# Patient Record
Sex: MALE | Race: BLACK OR AFRICAN AMERICAN | NOT HISPANIC OR LATINO | Employment: FULL TIME | URBAN - METROPOLITAN AREA
[De-identification: names, ages, dates, MRNs, and addresses within clinical notes are randomized per-mention and may not be internally consistent; named-entity substitution may affect disease eponyms.]

---

## 2020-08-28 ENCOUNTER — OFFICE VISIT (OUTPATIENT)
Dept: INTERNAL MEDICINE | Facility: CLINIC | Age: 35
End: 2020-08-28

## 2020-08-28 VITALS
BODY MASS INDEX: 42.66 KG/M2 | OXYGEN SATURATION: 99 % | DIASTOLIC BLOOD PRESSURE: 98 MMHG | TEMPERATURE: 96 F | HEIGHT: 72 IN | WEIGHT: 315 LBS | HEART RATE: 97 BPM | SYSTOLIC BLOOD PRESSURE: 148 MMHG

## 2020-08-28 DIAGNOSIS — R10.13 DYSPEPSIA: ICD-10-CM

## 2020-08-28 DIAGNOSIS — Z79.4 TYPE 2 DIABETES MELLITUS WITHOUT COMPLICATION, WITH LONG-TERM CURRENT USE OF INSULIN (HCC): Primary | ICD-10-CM

## 2020-08-28 DIAGNOSIS — E11.9 TYPE 2 DIABETES MELLITUS WITHOUT COMPLICATION, WITH LONG-TERM CURRENT USE OF INSULIN (HCC): Primary | ICD-10-CM

## 2020-08-28 DIAGNOSIS — L03.012 ACUTE PARONYCHIA OF FINGER OF LEFT HAND: ICD-10-CM

## 2020-08-28 DIAGNOSIS — N52.9 ERECTILE DYSFUNCTION, UNSPECIFIED ERECTILE DYSFUNCTION TYPE: ICD-10-CM

## 2020-08-28 PROCEDURE — 36415 COLL VENOUS BLD VENIPUNCTURE: CPT | Performed by: NURSE PRACTITIONER

## 2020-08-28 PROCEDURE — 99203 OFFICE O/P NEW LOW 30 MIN: CPT | Performed by: NURSE PRACTITIONER

## 2020-08-28 RX ORDER — AMOXICILLIN 500 MG/1
1000 CAPSULE ORAL 2 TIMES DAILY
COMMUNITY
End: 2020-08-28

## 2020-08-28 RX ORDER — OMEPRAZOLE 40 MG/1
40 CAPSULE, DELAYED RELEASE ORAL DAILY
Qty: 30 CAPSULE | Refills: 1 | Status: SHIPPED | OUTPATIENT
Start: 2020-08-28 | End: 2021-01-04 | Stop reason: SDUPTHER

## 2020-08-28 RX ORDER — CEPHALEXIN 500 MG/1
500 TABLET ORAL 3 TIMES DAILY
Qty: 21 TABLET | Refills: 0 | Status: SHIPPED | OUTPATIENT
Start: 2020-08-28 | End: 2020-09-04

## 2020-08-30 PROBLEM — Z79.4 TYPE 2 DIABETES MELLITUS WITHOUT COMPLICATION, WITH LONG-TERM CURRENT USE OF INSULIN (HCC): Status: ACTIVE | Noted: 2020-08-30

## 2020-08-30 PROBLEM — E11.9 TYPE 2 DIABETES MELLITUS WITHOUT COMPLICATION, WITH LONG-TERM CURRENT USE OF INSULIN: Status: ACTIVE | Noted: 2020-08-30

## 2020-08-30 PROBLEM — N52.9 ERECTILE DYSFUNCTION: Status: ACTIVE | Noted: 2020-08-30

## 2020-08-30 LAB
ALBUMIN SERPL-MCNC: 4.6 G/DL (ref 3.5–5.2)
ALBUMIN/CREAT UR: 73 MG/G CREAT (ref 0–29)
ALBUMIN/GLOB SERPL: 2 G/DL
ALP SERPL-CCNC: 72 U/L (ref 39–117)
ALT SERPL-CCNC: 95 U/L (ref 1–41)
AST SERPL-CCNC: 63 U/L (ref 1–40)
BASOPHILS # BLD AUTO: 0.03 10*3/MM3 (ref 0–0.2)
BASOPHILS NFR BLD AUTO: 0.5 % (ref 0–1.5)
BILIRUB SERPL-MCNC: 0.4 MG/DL (ref 0–1.2)
BUN SERPL-MCNC: 12 MG/DL (ref 6–20)
BUN/CREAT SERPL: 13.5 (ref 7–25)
CALCIUM SERPL-MCNC: 9.9 MG/DL (ref 8.6–10.5)
CHLORIDE SERPL-SCNC: 99 MMOL/L (ref 98–107)
CHOLEST SERPL-MCNC: 154 MG/DL (ref 0–200)
CO2 SERPL-SCNC: 27.4 MMOL/L (ref 22–29)
CREAT SERPL-MCNC: 0.89 MG/DL (ref 0.76–1.27)
CREAT UR-MCNC: 194.2 MG/DL
EOSINOPHIL # BLD AUTO: 0.06 10*3/MM3 (ref 0–0.4)
EOSINOPHIL NFR BLD AUTO: 1 % (ref 0.3–6.2)
ERYTHROCYTE [DISTWIDTH] IN BLOOD BY AUTOMATED COUNT: 13.5 % (ref 12.3–15.4)
GLOBULIN SER CALC-MCNC: 2.3 GM/DL
GLUCOSE SERPL-MCNC: 103 MG/DL (ref 65–99)
HBA1C MFR BLD: 10.6 % (ref 4.8–5.6)
HCT VFR BLD AUTO: 43.5 % (ref 37.5–51)
HDLC SERPL-MCNC: 44 MG/DL (ref 40–60)
HGB BLD-MCNC: 14.5 G/DL (ref 13–17.7)
IMM GRANULOCYTES # BLD AUTO: 0.02 10*3/MM3 (ref 0–0.05)
IMM GRANULOCYTES NFR BLD AUTO: 0.3 % (ref 0–0.5)
LDLC SERPL CALC-MCNC: 71 MG/DL (ref 0–100)
LYMPHOCYTES # BLD AUTO: 1.74 10*3/MM3 (ref 0.7–3.1)
LYMPHOCYTES NFR BLD AUTO: 27.8 % (ref 19.6–45.3)
MCH RBC QN AUTO: 29.8 PG (ref 26.6–33)
MCHC RBC AUTO-ENTMCNC: 33.3 G/DL (ref 31.5–35.7)
MCV RBC AUTO: 89.3 FL (ref 79–97)
MICROALBUMIN UR-MCNC: 141.6 UG/ML
MONOCYTES # BLD AUTO: 0.74 10*3/MM3 (ref 0.1–0.9)
MONOCYTES NFR BLD AUTO: 11.8 % (ref 5–12)
NEUTROPHILS # BLD AUTO: 3.68 10*3/MM3 (ref 1.7–7)
NEUTROPHILS NFR BLD AUTO: 58.6 % (ref 42.7–76)
NRBC BLD AUTO-RTO: 0 /100 WBC (ref 0–0.2)
PLATELET # BLD AUTO: 261 10*3/MM3 (ref 140–450)
POTASSIUM SERPL-SCNC: 4.5 MMOL/L (ref 3.5–5.2)
PROT SERPL-MCNC: 6.9 G/DL (ref 6–8.5)
RBC # BLD AUTO: 4.87 10*6/MM3 (ref 4.14–5.8)
SODIUM SERPL-SCNC: 137 MMOL/L (ref 136–145)
TESTOST FREE SERPL-MCNC: 12.6 PG/ML (ref 8.7–25.1)
TESTOST SERPL-MCNC: 220 NG/DL (ref 264–916)
TRIGL SERPL-MCNC: 197 MG/DL (ref 0–150)
VLDLC SERPL CALC-MCNC: 39.4 MG/DL
WBC # BLD AUTO: 6.27 10*3/MM3 (ref 3.4–10.8)

## 2020-08-30 NOTE — PROGRESS NOTES
Subjective   Shanae Lockwood is a 35 y.o. male who present to establish care and to f/u on DM2, GERD and left thumb pain.    He plans to establish care and is currently followed for:  1.  DM2-he was originally diagnosed in 2011 when he was hospitalized with glucose readings over 700.  He states it has confirmed that he has type II.  He currently administers Novolin R and Novolin N, does not check his glucose readings regularly. He has not been seen in >2 years.  2.  Dyspepsia-he complains of increased heartburn and indigestion over the past few weeks.  Denies abdominal pain, no rectal bleeding.  3.  Left thumb pain-he complains of irritation along his left thumb for the past 3 without drainage.  No recent injury.    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 Pertinent negatives for hypoglycemia include no confusion, dizziness, headaches, nervousness/anxiousness, speech difficulty or tremors. Associated symptoms include fatigue. Pertinent negatives for diabetes include no chest pain, no polydipsia, no polyuria and no weakness.   Heartburn   He reports no abdominal pain, no chest pain, no choking, no coughing, no nausea, no sore throat or no wheezing. Associated symptoms include fatigue.   Hand Pain    Pertinent negatives include no chest pain or numbness.        The following portions of the patient's history were reviewed and updated as appropriate: allergies, current medications, past social history and problem list.    No past medical history on file.      Current Outpatient Medications:   •  Insulin NPH Human, Isophane, (NOVOLIN N SC), Inject  under the skin into the appropriate area as directed., Disp: , Rfl:   •  Insulin Regular Human (NOVOLIN R IJ), Inject  as directed 2 (two) times a day., Disp: , Rfl:   •  Cephalexin 500 MG tablet, Take 500 mg by mouth 3 (Three) Times a Day  for 7 days., Disp: 21 tablet, Rfl: 0  •  omeprazole (priLOSEC) 40 MG capsule, Take 1 capsule by mouth Daily., Disp: 30 capsule, Rfl: 1    No Known Allergies    Review of Systems   Constitutional: Positive for fatigue. Negative for activity change, appetite change, chills, diaphoresis, fever and unexpected weight change.   HENT: Negative for congestion, dental problem, drooling, ear discharge, ear pain, facial swelling, hearing loss, mouth sores, nosebleeds, postnasal drip, rhinorrhea, sinus pressure, sore throat, tinnitus and trouble swallowing.    Eyes: Negative for photophobia, pain, discharge, redness, itching and visual disturbance.   Respiratory: Negative for apnea, cough, choking, chest tightness, shortness of breath and wheezing.    Cardiovascular: Negative for chest pain, palpitations and leg swelling.        No orthopnea, PND, JONES   Gastrointestinal: Negative for abdominal pain, blood in stool, constipation, diarrhea, nausea and vomiting.   Endocrine: Negative for cold intolerance, heat intolerance, polydipsia and polyuria.   Genitourinary: Negative for decreased urine volume, dysuria, enuresis, flank pain, frequency, hematuria and urgency.        C/o difficulty maintaining/sustaining an erection   Musculoskeletal: Negative for arthralgias, back pain, gait problem, joint swelling, myalgias, neck pain and neck stiffness.   Skin: Negative for color change and rash.        No hair changes, no nail changes   Allergic/Immunologic: Negative for environmental allergies, food allergies and immunocompromised state.   Neurological: Positive for seizures (+grand mal seizure in under grade (period of extreme stress), managed on meds until 2008 but disocntinued due to lack of seizures). Negative for dizziness, tremors, syncope, speech difficulty, weakness, light-headedness, numbness and headaches.   Hematological: Negative for adenopathy. Does not bruise/bleed easily.   Psychiatric/Behavioral: Negative for agitation,  "confusion, decreased concentration, dysphoric mood, sleep disturbance and suicidal ideas. The patient is not nervous/anxious.        Objective   Vitals:    08/28/20 0924   BP: 148/98   BP Location: Left arm   Patient Position: Sitting   Cuff Size: Adult   Pulse: 97   Temp: 96 °F (35.6 °C)   SpO2: 99%   Weight: (!) 154 kg (340 lb)   Height: 181.6 cm (71.5\")     Body mass index is 46.76 kg/m².  Physical Exam   Constitutional: He is oriented to person, place, and time. He appears well-developed and well-nourished. No distress.   HENT:   Right Ear: Hearing, tympanic membrane, external ear and ear canal normal.   Left Ear: Hearing, tympanic membrane, external ear and ear canal normal.   Nose: Right sinus exhibits no maxillary sinus tenderness and no frontal sinus tenderness. Left sinus exhibits no maxillary sinus tenderness and no frontal sinus tenderness.   Eyes: Pupils are equal, round, and reactive to light. Conjunctivae, EOM and lids are normal.   Neck: Trachea normal and phonation normal. Neck supple. Normal carotid pulses and no JVD present. Carotid bruit is not present. No thyroid mass and no thyromegaly present.   Cardiovascular: Normal rate, regular rhythm, S1 normal and S2 normal. PMI is not displaced. Exam reveals no gallop and no friction rub.   No murmur heard.  Pulses:       Carotid pulses are 2+ on the right side, and 2+ on the left side.       Radial pulses are 2+ on the right side, and 2+ on the left side.        Dorsalis pedis pulses are 2+ on the right side, and 2+ on the left side.   Pulmonary/Chest: Effort normal and breath sounds normal. He has no wheezes. He has no rhonchi. He has no rales. Chest wall is not dull to percussion.   Abdominal: Soft. Normal appearance, normal aorta and bowel sounds are normal. He exhibits no abdominal bruit and no mass. There is no hepatosplenomegaly. There is no tenderness.   Musculoskeletal: Normal range of motion. He exhibits no edema or tenderness. "   Lymphadenopathy:     He has no cervical adenopathy.        Right: No supraclavicular adenopathy present.        Left: No supraclavicular adenopathy present.   Neurological: He is alert and oriented to person, place, and time. He has normal strength and normal reflexes. No cranial nerve deficit or sensory deficit. Coordination normal.   Skin: Skin is warm and dry. No rash noted.   Erythema and exquisite tenderness along lateral nail bed of left thumb   Psychiatric: He has a normal mood and affect. His speech is normal and behavior is normal. Judgment and thought content normal. Cognition and memory are normal. He is attentive.   Nursing note and vitals reviewed.      Assessment/Plan   Shanae was seen today for establish care, diabetes, heartburn and hand pain.    Diagnoses and all orders for this visit:    Type 2 diabetes mellitus without complication, with long-term current use of insulin (CMS/[facility])  -     Hemoglobin A1c  -     Comprehensive Metabolic Panel  -     Lipid Panel  -     Microalbumin / Creatinine Urine Ratio - Urine, Clean Catch  -     CBC & Differential    Acute paronychia of finger of left hand  -     Cephalexin 500 MG tablet; Take 500 mg by mouth 3 (Three) Times a Day for 7 days.    Dyspepsia  -     omeprazole (priLOSEC) 40 MG capsule; Take 1 capsule by mouth Daily.    Erectile dysfunction, unspecified erectile dysfunction type  -     Testosterone, Free, Total    Other orders  -     Testosterone, Free, Total    1. We discussed his current medications for mgmt of DM2, he has experienced loose stools with Metformin in the past. Check A1c today and titrate medication as needed--goal is for A1c <7.  2. Area prepped with alcohol and I&D performed with 22G needle. Purulent matter expressed. Antibiotic ointment and band-aid applied to area. He will soak the thumb and begin Keflex, continue to monitor.  3. He c/o frequent episodes of heartburn and indigestion, will treat with Omeprazole for 6-8 weeks and  continue to monitor.  4. Check Testosterone levels.

## 2020-09-01 DIAGNOSIS — R79.89 LOW TESTOSTERONE: Primary | ICD-10-CM

## 2020-09-02 LAB
FSH SERPL-ACNC: 2.2 MIU/ML (ref 1.5–12.4)
LH SERPL-ACNC: 5.6 MIU/ML (ref 1.7–8.6)
REF LAB TEST METHOD: NORMAL
WRITTEN AUTHORIZATION: NORMAL

## 2020-09-03 ENCOUNTER — TELEPHONE (OUTPATIENT)
Dept: INTERNAL MEDICINE | Facility: CLINIC | Age: 35
End: 2020-09-03

## 2020-09-03 NOTE — TELEPHONE ENCOUNTER
----- Message from Ml Aburto MA sent at 9/2/2020  1:41 PM EDT -----  Regarding: FW: Test Results Question  Contact: [phone]      ----- Message -----  From: Shanae Lockwood  Sent: 9/2/2020  12:35 PM EDT  To: Julissa Campos [facility]  Subject: Test Results Question                            Hello,     Just wanted to follow up with you about my test results. I hope you are having a great week!     Kind regards,  Shanae Rivera message to return call re: lab results.

## 2020-09-04 ENCOUNTER — TELEPHONE (OUTPATIENT)
Dept: INTERNAL MEDICINE | Facility: CLINIC | Age: 35
End: 2020-09-04

## 2020-09-04 DIAGNOSIS — Z79.4 TYPE 2 DIABETES MELLITUS WITHOUT COMPLICATION, WITH LONG-TERM CURRENT USE OF INSULIN (HCC): Primary | ICD-10-CM

## 2020-09-04 DIAGNOSIS — E11.9 TYPE 2 DIABETES MELLITUS WITHOUT COMPLICATION, WITH LONG-TERM CURRENT USE OF INSULIN (HCC): Primary | ICD-10-CM

## 2020-09-04 RX ORDER — METFORMIN HYDROCHLORIDE 500 MG/1
500 TABLET, EXTENDED RELEASE ORAL 2 TIMES DAILY
Qty: 180 TABLET | Refills: 1 | Status: SHIPPED | OUTPATIENT
Start: 2020-09-04 | End: 2021-11-21 | Stop reason: SDUPTHER

## 2020-09-04 NOTE — TELEPHONE ENCOUNTER
PT CALLED RETURNING SHEREE TASHIA PHONE CALL FROM YESTERDAY 09/03 REGARDING LAB RESULTS AND A MEDICATION.    ATTEMPTED TO WARM TRANSFER, SPOKE WITH HEBER IN OFFICE. HEBER STATED MARILIA WAS WITH A PT TO TAKE A MESSAGE     PLEASE ADVISE     PT CALL BACK   [phone] CELL   [phone] WORK

## 2020-09-04 NOTE — TELEPHONE ENCOUNTER
Discussed recent lab results with patient-he is agreeable to starting Metformin, advised to bring glucose reaidngs to next appt. Also discussed changing insulin to Levemir for better glucose control which he is states is not an option at this point due to his high deductible plan on his insurance. We also discussed his low testosterone levels and treatment options, will discuss further at f/u appt.

## 2020-09-14 ENCOUNTER — PATIENT MESSAGE (OUTPATIENT)
Dept: INTERNAL MEDICINE | Facility: CLINIC | Age: 35
End: 2020-09-14

## 2020-09-14 NOTE — TELEPHONE ENCOUNTER
From: Shanae Lockwood  To: ASHWIN Ward  Sent: 9/14/2020 11:56 AM EDT  Subject: Non-Urgent Medical Question    Hello,    I hope you are well today. I am not feeling well today, I woke up with a very bad headache and body aches. I took ibuprofen but the headache persists (1050 mg since 8am). I'm light headed, I feel weak, and have slight nausea. I don't have a fever as of this morning. I am about to leave work because I feel really tired. I'm reaching out to see if this can be serious. I can taste and smell things. I don't believe I came in contact with anyone that has COVID. I always wear a mask, wash my hands, and use hand  when I'm out and about. I started feeling really tired last night and went to bed around 6pm getting roughly 12 hrs of sleep (slightly broken up).     My blood sugar is 100 as of 10am. I've not noticed any negative side effects from the metformin. I do feel that maybe getting some testosterone shots can make me feel less fatigued all the time and may assist with having motivation and energy to work out. I wanted to see if we could maybe address this sooner rather than waiting for my appointment.     Thanks. I look forward to hearing from you.     Shanae

## 2020-09-25 ENCOUNTER — OFFICE VISIT (OUTPATIENT)
Dept: INTERNAL MEDICINE | Facility: CLINIC | Age: 35
End: 2020-09-25

## 2020-09-25 VITALS
HEIGHT: 72 IN | OXYGEN SATURATION: 98 % | BODY MASS INDEX: 42.66 KG/M2 | HEART RATE: 105 BPM | DIASTOLIC BLOOD PRESSURE: 86 MMHG | SYSTOLIC BLOOD PRESSURE: 144 MMHG | TEMPERATURE: 98.2 F | WEIGHT: 315 LBS

## 2020-09-25 DIAGNOSIS — E11.65 UNCONTROLLED TYPE 2 DIABETES MELLITUS WITH HYPERGLYCEMIA (HCC): Primary | ICD-10-CM

## 2020-09-25 DIAGNOSIS — Z23 NEED FOR INFLUENZA VACCINATION: ICD-10-CM

## 2020-09-25 DIAGNOSIS — R79.89 LOW TESTOSTERONE: ICD-10-CM

## 2020-09-25 DIAGNOSIS — E11.21 DIABETIC NEPHROPATHY WITH PROTEINURIA (HCC): ICD-10-CM

## 2020-09-25 PROCEDURE — 90471 IMMUNIZATION ADMIN: CPT | Performed by: NURSE PRACTITIONER

## 2020-09-25 PROCEDURE — 99214 OFFICE O/P EST MOD 30 MIN: CPT | Performed by: NURSE PRACTITIONER

## 2020-09-25 PROCEDURE — 90686 IIV4 VACC NO PRSV 0.5 ML IM: CPT | Performed by: NURSE PRACTITIONER

## 2020-09-25 RX ORDER — TESTOSTERONE CYPIONATE 100 MG/ML
100 INJECTION, SOLUTION INTRAMUSCULAR
Qty: 0.84 ML | Refills: 0 | Status: SHIPPED | OUTPATIENT
Start: 2020-09-25 | End: 2020-09-28 | Stop reason: SDUPTHER

## 2020-09-25 NOTE — PROGRESS NOTES
Missy Lockwood is a 35 y.o. male who presents for f/u regarding DM2 and low testosterone levels.    He has started Metformin and is tolerating medication well, denies GI side effects. He has brought his glucose readings which show an am reading of 110-130s, has an isolated reading of 351. Denies hypoglycemic episodes.  His testosterone levels were also low, he c/o increased lethargy throughout the day with decreased sexual performance. He has recently had a sperm analysis through a fertility doctor with normal sperm motility and count.  His nail irritation has resolved since his last visit. He also states malaise and respiratory sx have resolved. He does c/o fatigue throughout the day.    Diabetes  He has type 2 diabetes mellitus. No MedicAlert identification noted. The initial diagnosis of diabetes was made 11 years ago. Hypoglycemia symptoms include mood changes, nervousness/anxiousness and sweats. Pertinent negatives for hypoglycemia include no confusion, dizziness, headaches, hunger, pallor, seizures, sleepiness or speech difficulty. Associated symptoms include blurred vision and fatigue. Pertinent negatives for diabetes include no chest pain, no foot paresthesias, no foot ulcerations, no polydipsia, no polyphagia, no polyuria, no visual change, no weakness and no weight loss. Pertinent negatives for hypoglycemia complications include no blackouts, no hospitalization, no nocturnal hypoglycemia, no required assistance and no required glucagon injection. Symptoms are stable. Diabetic complications include impotence, PVD and retinopathy. Pertinent negatives for diabetic complications include no CVA, heart disease, nephropathy or peripheral neuropathy. Risk factors for coronary artery disease include obesity and sedentary lifestyle. Current diabetic treatment includes insulin injections and oral agent (monotherapy). He is compliant with treatment most of the time. He is currently taking insulin pre-lunch,  "pre-dinner and at bedtime. Insulin injections are given by patient. Rotation sites for injection include the abdominal wall. His weight is fluctuating minimally. He is following a generally unhealthy, high fat/cholesterol and vegetarian diet. Meal planning includes avoidance of concentrated sweets. He has not had a previous visit with a dietitian. He participates in exercise intermittently. He monitors blood glucose at home 3-4 x per week. He monitors urine at home <1 x per month. Blood glucose monitoring compliance is inadequate. His home blood glucose trend is fluctuating dramatically. His breakfast blood glucose is taken between 7-8 am. His breakfast blood glucose range is generally 110-130 mg/dl. His lunch blood glucose is taken between 1-2 pm. His lunch blood glucose range is generally 140-180 mg/dl. His dinner blood glucose is taken between 7-8 pm. His dinner blood glucose range is generally 110-130 mg/dl. His highest blood glucose is >200 mg/dl. His overall blood glucose range is 110-130 mg/dl. He does not see a podiatrist.Eye exam is not current.        The following portions of the patient's history were reviewed and updated as appropriate: allergies, current medications, past social history and problem list.    History reviewed. No pertinent past medical history.      Current Outpatient Medications:   •  Insulin NPH Human, Isophane, (NOVOLIN N SC), Inject 100 Units under the skin into the appropriate area as directed Daily With Breakfast, Lunch & Dinner., Disp: , Rfl:   •  Insulin Regular Human (NOVOLIN R IJ), Inject 100 Units as directed 2 (two) times a day., Disp: , Rfl:   •  metFORMIN ER (GLUCOPHAGE-XR) 500 MG 24 hr tablet, Take 1 tablet by mouth 2 (two) times a day., Disp: 180 tablet, Rfl: 1  •  omeprazole (priLOSEC) 40 MG capsule, Take 1 capsule by mouth Daily., Disp: 30 capsule, Rfl: 1  •  Needle, Disp, 21G X 1\" misc, 1 Units Every 14 (Fourteen) Days., Disp: 50 each, Rfl: 1  •  testosterone cypionate " "(Depo-Testosterone) 100 MG/ML solution injection, Inject 1 mL into the appropriate muscle as directed by prescriber Every 14 (Fourteen) Days., Disp: 0.84 mL, Rfl: 0    No Known Allergies    Review of Systems   Constitutional: Positive for fatigue. Negative for chills, fever, unexpected weight change and weight loss.   HENT: Positive for congestion and postnasal drip. Negative for ear pain, sinus pressure, sore throat and trouble swallowing.    Eyes: Positive for blurred vision. Negative for visual disturbance.   Respiratory: Negative for cough, chest tightness and wheezing.    Cardiovascular: Negative for chest pain, palpitations and leg swelling.   Gastrointestinal: Negative for abdominal pain, blood in stool, nausea and vomiting.   Endocrine: Negative for polydipsia, polyphagia and polyuria.   Genitourinary: Positive for impotence. Negative for dysuria, frequency and urgency.   Musculoskeletal: Negative for arthralgias and joint swelling.   Skin: Negative for color change and pallor.   Neurological: Negative for dizziness, seizures, syncope, speech difficulty, weakness and headaches.   Hematological: Does not bruise/bleed easily.   Psychiatric/Behavioral: Negative for confusion and sleep disturbance. The patient is nervous/anxious.        Objective   Vitals:    09/25/20 0917   BP: 144/86   BP Location: Left arm   Patient Position: Sitting   Cuff Size: Adult   Pulse: 105   Temp: 98.2 °F (36.8 °C)   TempSrc: Oral   SpO2: 98%   Weight: (!) 160 kg (353 lb)   Height: 181.6 cm (71.5\")     Body mass index is 48.55 kg/m².  Physical Exam  Constitutional:       Appearance: He is well-developed. He is not ill-appearing.   HENT:      Head: Normocephalic.      Right Ear: Hearing, tympanic membrane and external ear normal. No decreased hearing noted. No drainage, swelling or tenderness. No middle ear effusion. No mastoid tenderness. Tympanic membrane is not injected, scarred, erythematous or bulging. Tympanic membrane has " normal mobility.      Left Ear: Hearing, tympanic membrane and external ear normal.      Nose: Nose normal. No nasal deformity, mucosal edema or rhinorrhea.      Right Sinus: No maxillary sinus tenderness or frontal sinus tenderness.      Left Sinus: No maxillary sinus tenderness or frontal sinus tenderness.      Mouth/Throat:      Dentition: Normal dentition.   Eyes:      General: Lids are normal.         Right eye: No discharge.         Left eye: No discharge.      Conjunctiva/sclera: Conjunctivae normal.      Right eye: No exudate.     Left eye: No exudate.  Neck:      Musculoskeletal: Normal range of motion. No edema.      Thyroid: No thyroid mass or thyromegaly.      Vascular: No carotid bruit.      Trachea: Trachea normal.   Cardiovascular:      Rate and Rhythm: Regular rhythm.      Pulses: Normal pulses.      Heart sounds: Normal heart sounds. No murmur.   Pulmonary:      Effort: No respiratory distress.      Breath sounds: Normal breath sounds. No decreased breath sounds, wheezing, rhonchi or rales.   Abdominal:      General: Bowel sounds are normal.      Palpations: Abdomen is soft.      Tenderness: There is no abdominal tenderness.   Lymphadenopathy:      Head:      Right side of head: No submental, submandibular, tonsillar, preauricular, posterior auricular or occipital adenopathy.      Left side of head: No submental, submandibular, tonsillar, preauricular, posterior auricular or occipital adenopathy.   Skin:     General: Skin is warm and dry.      Nails: There is no clubbing.     Neurological:      Mental Status: He is alert.   Psychiatric:         Behavior: Behavior is cooperative.         Assessment/Plan   Shanae was seen today for diabetes and low testosterone.    Diagnoses and all orders for this visit:    Uncontrolled type 2 diabetes mellitus with hyperglycemia (CMS/[facility])    Low testosterone  -     testosterone cypionate (Depo-Testosterone) 100 MG/ML solution injection; Inject 1 mL into the  "appropriate muscle as directed by prescriber Every 14 (Fourteen) Days.  -     Needle, Disp, 21G X 1\" misc; 1 Units Every 14 (Fourteen) Days.    Need for influenza vaccination  -     FluLaval Quad >6 Months ([id number])    1. Uncontrolled DM2-he will continue Metformin which he is tolerating well. Discussed switching to long-acting insulin for improved glucose control, recent A1c 10.9. He will check into patient assistance programs, currently has a large deductible insurance plan and long-acting insulin is not an option.  2. Low testosterone-discussed treatment options as well as side effects of medication. He would like to begin injections-he will receive the first at the clinic with education to administer at home.  3. He will begin Lisinopril for renal protection, monitor for low BP readings.    He will fu in 3 months for repeat labs and evaluation of DM2.       "

## 2020-09-26 PROBLEM — E11.65 UNCONTROLLED TYPE 2 DIABETES MELLITUS WITH HYPERGLYCEMIA: Status: ACTIVE | Noted: 2020-09-26

## 2020-09-26 PROBLEM — R79.89 LOW TESTOSTERONE: Status: ACTIVE | Noted: 2020-09-26

## 2020-09-26 RX ORDER — LISINOPRIL 5 MG/1
5 TABLET ORAL DAILY
Qty: 90 TABLET | Refills: 1 | Status: SHIPPED | OUTPATIENT
Start: 2020-09-26 | End: 2021-12-14

## 2020-09-28 DIAGNOSIS — R79.89 LOW TESTOSTERONE: ICD-10-CM

## 2020-09-28 RX ORDER — TESTOSTERONE CYPIONATE 100 MG/ML
100 INJECTION, SOLUTION INTRAMUSCULAR
Qty: 10 ML | Refills: 0 | Status: SHIPPED | OUTPATIENT
Start: 2020-09-28 | End: 2021-01-04 | Stop reason: SDUPTHER

## 2020-09-28 RX ORDER — TESTOSTERONE CYPIONATE 100 MG/ML
100 INJECTION, SOLUTION INTRAMUSCULAR
Qty: 0.84 ML | Refills: 0 | Status: CANCELLED | OUTPATIENT
Start: 2020-09-28

## 2020-12-01 ENCOUNTER — E-VISIT (OUTPATIENT)
Dept: FAMILY MEDICINE CLINIC | Facility: TELEHEALTH | Age: 35
End: 2020-12-01

## 2020-12-01 DIAGNOSIS — J02.9 ACUTE PHARYNGITIS, UNSPECIFIED ETIOLOGY: Primary | ICD-10-CM

## 2020-12-01 DIAGNOSIS — K12.2 UVULITIS: ICD-10-CM

## 2020-12-01 PROCEDURE — 99422 OL DIG E/M SVC 11-20 MIN: CPT | Performed by: NURSE PRACTITIONER

## 2020-12-01 NOTE — PATIENT INSTRUCTIONS
Uvulitis    Uvulitis is inflammation of the uvula. The uvula is the small, finger-like piece of tissue that hangs down at the back of the throat. Uvulitis causes swelling and soreness of the uvula. It can also cause other symptoms, depending on the cause and severity of the condition.  What are the causes?  This condition may be caused by:  · An infection in the mouth or throat. This is the most common cause.  · Trauma or injury to the uvula. Causes of trauma include burning your mouth, damage from a medical procedure, and heavy snoring.  · Fluid buildup (edema). Edema can be triggered by an allergic reaction. Uvulitis that is caused by edema is called Quincke disease.  · Inhaling chemicals, smoke, steam, or other substances that irritate the body.  What are the signs or symptoms?  Symptoms of this condition depend on the cause.  Symptoms of uvulitis that is caused by infection include:  · Red, swollen uvula.  · Sore throat.  · Fever.  · Headache.  · Swollen neck glands.  Symptoms of uvulitis that is caused by trauma, edema, or irritation include:  · Red, swollen uvula.  · Sore throat.  · Trouble swallowing.  · Choking or gagging.  · Trouble breathing.  How is this diagnosed?  This condition is diagnosed with a physical exam. You may also have tests, such as a throat culture or blood tests, to help find the cause of the condition.  How is this treated?  Treatment for this condition depends on the cause. Treatment may involve:  · Antibiotic medicine for a bacterial infection.  · Steroid medicine if the condition is caused by edema.  · Surgery to remove part of the uvula (partial uvulectomy). Surgery is only needed in rare cases where the swelling does not go away after trying other treatments.  Follow these instructions at home:    Medicines  · Take over-the-counter and prescription medicines only as told by your health care provider.  · If you were prescribed an antibiotic medicine, take it as told by your health  care provider. Do not stop taking the antibiotic even if you start to feel better.  Managing pain and soreness    · Use a cool-mist humidifier to add moisture to the air. This can help ease irritation in your throat.  · While your throat is sore:  ? Eat a diet of soft foods or liquids.  ? Gargle with a salt-water mixture 3-4 times a day or as needed. To make a salt-water mixture, completely dissolve ½-1 tsp of salt in 1 cup of warm water.  General instructions  · Drink enough fluid to keep your urine pale yellow.  · Keep all follow-up visits as told by your health care provider. This is important.  Contact a health care provider if:  · You have a fever.  · You have trouble eating.  · Your symptoms do not get better.  · Your symptoms come back after treatment.  Get help right away if:  · You have trouble breathing.  · You have trouble swallowing.  Summary  · Uvulitis is inflammation of the uvula, which is the small, finger-like piece of tissue that hangs down at the back of the throat.  · Uvulitis causes swelling and soreness of the uvula.  · This condition may be treated with antibiotics or steroids. In rare cases, surgery may be needed.  This information is not intended to replace advice given to you by your health care provider. Make sure you discuss any questions you have with your health care provider.  Document Revised: 04/04/2019 Document Reviewed: 04/04/2019  Omn[employer] Patient Education © 2020 [employer]  Sore Throat  A sore throat is pain, burning, irritation, or scratchiness in the throat. When you have a sore throat, you may feel pain or tenderness in your throat when you swallow or talk.  Many things can cause a sore throat, including:  · An infection.  · Seasonal allergies.  · Dryness in the air.  · Irritants, such as smoke or pollution.  · Radiation treatment to the area.  · Gastroesophageal reflux disease (GERD).  · A tumor.  A sore throat is often the first sign of another sickness. It may happen  with other symptoms, such as coughing, sneezing, fever, and swollen neck glands. Most sore throats go away without medical treatment.  Follow these instructions at home:         · Take over-the-counter medicines only as told by your health care provider.  ? If your child has a sore throat, do not give your child aspirin because of the association with Reye syndrome.  · Drink enough fluids to keep your urine pale yellow.  · Rest as needed.  · To help with pain, try:  ? Sipping warm liquids, such as broth, herbal tea, or warm water.  ? Eating or drinking cold or frozen liquids, such as frozen ice pops.  ? Gargling with a salt-water mixture 3-4 times a day or as needed. To make a salt-water mixture, completely dissolve ½-1 tsp (3-6 g) of salt in 1 cup (237 mL) of warm water.  ? Sucking on hard candy or throat lozenges.  ? Putting a cool-mist humidifier in your bedroom at night to moisten the air.  ? Sitting in the bathroom with the door closed for 5-10 minutes while you run hot water in the shower.  · Do not use any products that contain nicotine or tobacco, such as cigarettes, e-cigarettes, and chewing tobacco. If you need help quitting, ask your health care provider.  · Wash your hands well and often with soap and water. If soap and water are not available, use hand .  Contact a health care provider if:  · You have a fever for more than 2-3 days.  · You have symptoms that last (are persistent) for more than 2-3 days.  · Your throat does not get better within 7 days.  · You have a fever and your symptoms suddenly get worse.  · Your child who is 3 months to 3 years old has a temperature of 102.2°F (39°C) or higher.  Get help right away if:  · You have difficulty breathing.  · You cannot swallow fluids, soft foods, or your saliva.  · You have increased swelling in your throat or neck.  · You have persistent nausea and vomiting.  Summary  · A sore throat is pain, burning, irritation, or scratchiness in the  throat. Many things can cause a sore throat.  · Take over-the-counter medicines only as told by your health care provider. Do not give your child aspirin.  · Drink plenty of fluids, and rest as needed.  · Contact a health care provider if your symptoms worsen or your sore throat does not get better within 7 days.  This information is not intended to replace advice given to you by your health care provider. Make sure you discuss any questions you have with your health care provider.  Document Revised: 05/20/2019 Document Reviewed: 05/20/2019  Elsevier Patient Education © 2020 [employer]

## 2020-12-01 NOTE — PROGRESS NOTES
I have reviewed the e-Visit questionnaire and patient's answers, and my assessment and plan are as follows:    HPI  Shanae Lockwood is a 35 y.o. male  presents with complaint of uvula swelling, occasional dry cough, and sore throat starting today. Smoker in social situations. Has a history of diabetes. He reports he was able to see red swollen tonsils and uvula with a flashlight. Temp is 97.6 and no fever. No known strep exposure. He reports feeling okay besides the sore throat and uvula swelling.     Review of Systems - Negative except those listed in the HPI.      Diagnoses and all orders for this visit:    1. Acute pharyngitis, unspecified etiology (Primary)    2. Uvulitis    *Take ibuprofen as needed which may decrease some of the swelling.   **Patient refused steroids or antibiotics at this time.         FOLLOW-UP  If symptoms worsen or fail to improve, follow-up with PCP/Urgent Care/Emergency Department.      Time Documentation  Counseled patient  Counseling topics: diagnosis, treatment options and follow up plan  Total encounter time: counseling time more than 50% of visit: 15 minutes        Ashley Car, ASHWIN  12/01/20  12:39 EST

## 2020-12-07 DIAGNOSIS — R79.89 LOW TESTOSTERONE: ICD-10-CM

## 2020-12-28 ENCOUNTER — OFFICE VISIT (OUTPATIENT)
Dept: INTERNAL MEDICINE | Facility: CLINIC | Age: 35
End: 2020-12-28

## 2020-12-28 VITALS
WEIGHT: 315 LBS | SYSTOLIC BLOOD PRESSURE: 144 MMHG | OXYGEN SATURATION: 96 % | HEART RATE: 106 BPM | HEIGHT: 72 IN | DIASTOLIC BLOOD PRESSURE: 90 MMHG | RESPIRATION RATE: 16 BRPM | BODY MASS INDEX: 42.66 KG/M2

## 2020-12-28 DIAGNOSIS — R79.89 LOW TESTOSTERONE: ICD-10-CM

## 2020-12-28 DIAGNOSIS — E11.65 UNCONTROLLED TYPE 2 DIABETES MELLITUS WITH HYPERGLYCEMIA (HCC): Primary | ICD-10-CM

## 2020-12-28 DIAGNOSIS — Z11.59 SCREENING FOR VIRAL DISEASE: ICD-10-CM

## 2020-12-28 DIAGNOSIS — K21.9 GASTROESOPHAGEAL REFLUX DISEASE, UNSPECIFIED WHETHER ESOPHAGITIS PRESENT: ICD-10-CM

## 2020-12-28 PROCEDURE — 99214 OFFICE O/P EST MOD 30 MIN: CPT | Performed by: NURSE PRACTITIONER

## 2020-12-29 PROBLEM — K21.9 GASTROESOPHAGEAL REFLUX DISEASE: Status: ACTIVE | Noted: 2020-12-29

## 2020-12-29 RX ORDER — TESTOSTERONE CYPIONATE 100 MG/ML
200 INJECTION, SOLUTION INTRAMUSCULAR
Qty: 2 ML | Refills: 1 | Status: CANCELLED | OUTPATIENT
Start: 2020-12-29

## 2020-12-29 NOTE — PROGRESS NOTES
Missy Lockwood is a 35 y.o. male who presents for f/u regarding DM2, low testosterone and a penile rash.    His last A1c was elevated at 10, he states glucose improved with Metformin (tolerating well) but also intermittently forgets his mealtime insulin.   He increased his testosterone injections to 200mg q3fdmpx on his own (previously on 100mg) which he states has been helpful.  He c/o a pruritic rash on the tip of the penis last week which improved with an antifungal cream.    Diabetes  He has type 2 diabetes mellitus. No MedicAlert identification noted. The initial diagnosis of diabetes was made 9 years ago. Hypoglycemia symptoms include mood changes, nervousness/anxiousness, sweats and tremors. Pertinent negatives for hypoglycemia include no confusion, dizziness, headaches, hunger, pallor, seizures, sleepiness or speech difficulty. Associated symptoms include blurred vision, fatigue, visual change and weakness. Pertinent negatives for diabetes include no chest pain, no foot paresthesias, no foot ulcerations, no polydipsia, no polyphagia, no polyuria and no weight loss. Hypoglycemia complications include nocturnal hypoglycemia. Pertinent negatives for hypoglycemia complications include no blackouts, no hospitalization, no required assistance and no required glucagon injection. Symptoms are improving. Diabetic complications include impotence and retinopathy. Pertinent negatives for diabetic complications include no CVA, heart disease, nephropathy, peripheral neuropathy or PVD. Risk factors for coronary artery disease include obesity and sedentary lifestyle. Current diabetic treatment includes insulin injections and oral agent (monotherapy). He is compliant with treatment most of the time. He is currently taking insulin pre-dinner. Insulin injections are given by patient. Rotation sites for injection include the abdominal wall. His weight is increasing steadily. He is following a vegetarian diet. When asked  "about meal planning, he reported none. He has not had a previous visit with a dietitian. He rarely participates in exercise. He monitors blood glucose at home 1-2 x per week. He monitors urine at home <1 x per month. Blood glucose monitoring compliance is poor. His home blood glucose trend is increasing rapidly. He does not see a podiatrist.Eye exam is current.   Rash  Associated symptoms include fatigue. Pertinent negatives include no congestion, cough, fever, sore throat or vomiting.        The following portions of the patient's history were reviewed and updated as appropriate: allergies, current medications, past social history and problem list.    History reviewed. No pertinent past medical history.      Current Outpatient Medications:   •  Insulin NPH Human, Isophane, (NOVOLIN N SC), Inject 100 Units under the skin into the appropriate area as directed Daily With Breakfast, Lunch & Dinner., Disp: , Rfl:   •  Insulin Regular Human (NOVOLIN R IJ), Inject 100 Units as directed 2 (two) times a day., Disp: , Rfl:   •  lisinopril (PRINIVIL,ZESTRIL) 5 MG tablet, Take 1 tablet by mouth Daily., Disp: 90 tablet, Rfl: 1  •  metFORMIN ER (GLUCOPHAGE-XR) 500 MG 24 hr tablet, Take 1 tablet by mouth 2 (two) times a day., Disp: 180 tablet, Rfl: 1  •  Needle, Disp, 21G X 1\" misc, 1 Units Every 14 (Fourteen) Days., Disp: 50 each, Rfl: 1  •  omeprazole (priLOSEC) 40 MG capsule, Take 1 capsule by mouth Daily., Disp: 30 capsule, Rfl: 1  •  testosterone cypionate (Depo-Testosterone) 100 MG/ML solution injection, Inject 1 mL into the appropriate muscle as directed by prescriber Every 14 (Fourteen) Days. (Patient taking differently: Inject 200 mg into the appropriate muscle as directed by prescriber Every 14 (Fourteen) Days.), Disp: 10 mL, Rfl: 0    No Known Allergies    Review of Systems   Constitutional: Positive for fatigue. Negative for chills, fever, unexpected weight change and weight loss.   HENT: Negative for congestion, ear " "pain, postnasal drip, sinus pressure, sore throat and trouble swallowing.    Eyes: Positive for blurred vision. Negative for visual disturbance.   Respiratory: Negative for cough, chest tightness and wheezing.    Cardiovascular: Negative for chest pain, palpitations and leg swelling.   Gastrointestinal: Negative for abdominal pain, blood in stool, nausea and vomiting.   Endocrine: Negative for polydipsia, polyphagia and polyuria.   Genitourinary: Positive for impotence. Negative for dysuria, frequency and urgency.   Musculoskeletal: Negative for arthralgias and joint swelling.   Skin: Positive for rash. Negative for color change and pallor.   Neurological: Positive for tremors and weakness. Negative for dizziness, seizures, syncope, speech difficulty and headaches.   Hematological: Does not bruise/bleed easily.   Psychiatric/Behavioral: Negative for confusion. The patient is nervous/anxious.        Objective   Vitals:    12/28/20 1328   BP: 144/90   BP Location: Left arm   Patient Position: Sitting   Cuff Size: Adult   Pulse: 106   Resp: 16   SpO2: 96%   Weight: (!) 166 kg (366 lb)   Height: 181.6 cm (71.5\")     Body mass index is 50.34 kg/m².  Physical Exam  Constitutional:       Appearance: He is well-developed. He is not ill-appearing.   HENT:      Head: Normocephalic.      Right Ear: Hearing, tympanic membrane and external ear normal.      Left Ear: Hearing, tympanic membrane and external ear normal.      Nose: Nose normal. No nasal deformity, mucosal edema or rhinorrhea.      Right Sinus: No maxillary sinus tenderness or frontal sinus tenderness.      Left Sinus: No maxillary sinus tenderness or frontal sinus tenderness.      Mouth/Throat:      Dentition: Normal dentition.   Eyes:      General: Lids are normal.         Right eye: No discharge.         Left eye: No discharge.      Conjunctiva/sclera: Conjunctivae normal.      Right eye: No exudate.     Left eye: No exudate.     Pupils: Pupils are equal, round, " and reactive to light.   Neck:      Musculoskeletal: Normal range of motion. No edema.      Thyroid: No thyroid mass or thyromegaly.      Vascular: No carotid bruit.      Trachea: Trachea normal.   Cardiovascular:      Rate and Rhythm: Regular rhythm.      Pulses: Normal pulses.           Dorsalis pedis pulses are 2+ on the right side and 2+ on the left side.        Posterior tibial pulses are 2+ on the right side and 2+ on the left side.      Heart sounds: Normal heart sounds. No murmur.   Pulmonary:      Effort: No respiratory distress.      Breath sounds: Normal breath sounds. No decreased breath sounds, wheezing, rhonchi or rales.   Genitourinary:     Penis: No erythema, swelling or lesions.    Musculoskeletal:      Right foot: Normal range of motion.      Left foot: Normal range of motion.   Feet:      Right foot:      Protective Sensation: 10 sites tested. 10 sites sensed.      Skin integrity: Skin integrity normal. No ulcer, blister or skin breakdown.      Toenail Condition: Right toenails are normal.      Left foot:      Protective Sensation: 10 sites tested. 10 sites sensed.      Skin integrity: Skin integrity normal. No ulcer, blister or skin breakdown.      Toenail Condition: Left toenails are normal.      Comments: Diabetic Foot Exam Performed    Lymphadenopathy:      Head:      Right side of head: No submental, submandibular, tonsillar, preauricular, posterior auricular or occipital adenopathy.      Left side of head: No submental, submandibular, tonsillar, preauricular, posterior auricular or occipital adenopathy.   Skin:     General: Skin is warm and dry.      Nails: There is no clubbing.     Neurological:      Mental Status: He is alert.   Psychiatric:         Behavior: Behavior is cooperative.         Assessment/Plan   Diagnoses and all orders for this visit:    1. Uncontrolled type 2 diabetes mellitus with hyperglycemia (CMS/[facility]) (Primary)  -     Hemoglobin A1c; Future  -     Comprehensive  Metabolic Panel; Future    2. Low testosterone  -     Testosterone, Free, Total; Future    3. Screening for viral disease  -     Hepatitis C Antibody; Future    1. Uncontrolled DM2-he will return for repeat A1c since starting Metformin, discussed importance of regular dosing of insulin. His insurance does not cover long-acting insulin but hopeful to change in 2021. Also discussed endo evaluation if glucose remains uncontrolled.  2. Low testosterone-he has increased his Testosterone inj to 200mg q14 days, he is advised to come in for fasting labs this week (last injection last Wednesday) for repeat level.  3. GERD-improved with daily Omeprazole, continue to monitor.    No acute abnl noted on penis today, recent rash most likely due to fungal infection (glucose elevated). He will continue to monitor and f/u if sx return.

## 2021-01-04 DIAGNOSIS — R79.89 LOW TESTOSTERONE: ICD-10-CM

## 2021-01-04 DIAGNOSIS — R10.13 DYSPEPSIA: ICD-10-CM

## 2021-01-04 RX ORDER — OMEPRAZOLE 40 MG/1
40 CAPSULE, DELAYED RELEASE ORAL DAILY
Qty: 90 CAPSULE | Refills: 2 | Status: SHIPPED | OUTPATIENT
Start: 2021-01-04 | End: 2022-04-11 | Stop reason: SDUPTHER

## 2021-01-07 ENCOUNTER — TELEPHONE (OUTPATIENT)
Dept: INTERNAL MEDICINE | Facility: CLINIC | Age: 36
End: 2021-01-07

## 2021-01-07 RX ORDER — TESTOSTERONE CYPIONATE 100 MG/ML
200 INJECTION, SOLUTION INTRAMUSCULAR
Qty: 4 ML | Refills: 0 | Status: SHIPPED | OUTPATIENT
Start: 2021-01-07 | End: 2021-01-08 | Stop reason: SDUPTHER

## 2021-01-07 NOTE — TELEPHONE ENCOUNTER
PHARMACY CALLED AND STATES THE TESTOSTERONE PRESCRIPTION COMES IN 10 ML ONLY. THEY NEED A NEW PRESCRIPTION.   testosterone cypionate (Depo-Testosterone) 100 MG/ML solution injection    [facility] [address] [place] [address] ([facility]), [place] - [address] [facility] - [phone] [facility] [phone]   [phone]

## 2021-01-08 DIAGNOSIS — R79.89 LOW TESTOSTERONE: Primary | ICD-10-CM

## 2021-01-08 RX ORDER — TESTOSTERONE CYPIONATE 100 MG/ML
200 INJECTION, SOLUTION INTRAMUSCULAR
Qty: 10 ML | Refills: 0 | Status: SHIPPED | OUTPATIENT
Start: 2021-01-08 | End: 2021-03-07 | Stop reason: SDUPTHER

## 2021-02-10 ENCOUNTER — OFFICE VISIT (OUTPATIENT)
Dept: INTERNAL MEDICINE | Facility: CLINIC | Age: 36
End: 2021-02-10

## 2021-02-10 ENCOUNTER — APPOINTMENT (OUTPATIENT)
Dept: WOMENS IMAGING | Facility: HOSPITAL | Age: 36
End: 2021-02-10

## 2021-02-10 ENCOUNTER — TELEPHONE (OUTPATIENT)
Dept: INTERNAL MEDICINE | Facility: CLINIC | Age: 36
End: 2021-02-10

## 2021-02-10 VITALS
SYSTOLIC BLOOD PRESSURE: 148 MMHG | HEIGHT: 72 IN | DIASTOLIC BLOOD PRESSURE: 92 MMHG | BODY MASS INDEX: 42.66 KG/M2 | TEMPERATURE: 98 F | HEART RATE: 118 BPM | WEIGHT: 315 LBS | OXYGEN SATURATION: 99 %

## 2021-02-10 DIAGNOSIS — R60.0 EDEMA OF BOTH LOWER EXTREMITIES: ICD-10-CM

## 2021-02-10 DIAGNOSIS — J06.9 VIRAL URI: Primary | ICD-10-CM

## 2021-02-10 DIAGNOSIS — R06.02 SHORTNESS OF BREATH: ICD-10-CM

## 2021-02-10 DIAGNOSIS — Z20.822 SUSPECTED COVID-19 VIRUS INFECTION: ICD-10-CM

## 2021-02-10 PROCEDURE — 99214 OFFICE O/P EST MOD 30 MIN: CPT | Performed by: NURSE PRACTITIONER

## 2021-02-10 PROCEDURE — 71046 X-RAY EXAM CHEST 2 VIEWS: CPT | Performed by: RADIOLOGY

## 2021-02-10 PROCEDURE — 71046 X-RAY EXAM CHEST 2 VIEWS: CPT | Performed by: NURSE PRACTITIONER

## 2021-02-10 RX ORDER — GUAIFENESIN 600 MG/1
600 TABLET, EXTENDED RELEASE ORAL EVERY 12 HOURS SCHEDULED
Qty: 14 TABLET
Start: 2021-02-10 | End: 2021-02-16

## 2021-02-10 NOTE — TELEPHONE ENCOUNTER
PATIENT IS NEEDING A CALL BACK. HIS GIRLFRIEND HAS BACTERIAL PNEUMONIA AND HE IS HAVING A SLIGHT COUGH, ANKLES ARE SWOLLEN A LITTLE AND FEELS TIRED AND A LITTLE DIZZY DURING THE NIGHT. HE HAD A VIDEO VISIT FROM AN UNKNOWN SOURCE  AND THEY TOLD HIM HE NEEDED TO GO TO THE ED OR BE EVALUATED. HE IS WANTING TO KNOW WHAT HE SHOULD DO. DID NOT WANT TO DO A VIDEO VISIT SINCE HE ALREADY HAD ONE. PATIENT IS ASKING FOR A CALL PLEASE.     PLEASE ADVISE   [phone] CELL NUMBER  [phone] OFFICE NUMBER

## 2021-02-10 NOTE — TELEPHONE ENCOUNTER
He had e-visit scheduled but they advised him to go to ER (see note below), please call and clarify his sx. Thanks.

## 2021-02-11 LAB — SARS-COV-2 RNA RESP QL NAA+PROBE: DETECTED

## 2021-02-11 NOTE — PROGRESS NOTES
"Chief Complaint  Shortness of Breath, Leg Swelling, and Cough    Subjective     {Problem List  Visit Diagnosis   Encounters  Notes  Medications  Labs  Result Review Imaging  Media :23}     Shanae Lockwood presents to [facility] due to shortness of breath with cough. He also c/o swelling in lower legs.    He c/o onset of malaise Monday afternoon with a dry cough, denies fever or chills. His girlfriend was tested for COVID-19 on Monday which was negative. He has noticed increased chest tightness, denies sputum production. No change in smell or taste.  He also c/o intermittent swelling in his legs which is worse at end of day. Denies orthopnea.      Review of Systems   Constitutional: Positive for fatigue. Negative for fever.   HENT: Negative for congestion, postnasal drip and rhinorrhea.    Respiratory: Positive for cough, chest tightness and shortness of breath.    Cardiovascular: Positive for leg swelling.     Objective   Vital Signs:   /92 (BP Location: Left arm, Patient Position: Sitting, Cuff Size: Adult)   Pulse 118   Temp 98 °F (36.7 °C)   Ht 181.6 cm (71.5\")   Wt (!) 168 kg (371 lb 3.2 oz)   SpO2 99%   BMI 51.05 kg/m²     Physical Exam  Constitutional:       Appearance: He is well-developed. He is not ill-appearing.   HENT:      Head: Normocephalic.      Right Ear: Hearing, tympanic membrane and external ear normal.      Left Ear: Hearing, tympanic membrane and external ear normal.      Nose: Nose normal. No nasal deformity, mucosal edema or rhinorrhea.      Right Sinus: No maxillary sinus tenderness or frontal sinus tenderness.      Left Sinus: No maxillary sinus tenderness or frontal sinus tenderness.      Mouth/Throat:      Dentition: Normal dentition.   Eyes:      General: Lids are normal.         Right eye: No discharge.         Left eye: No discharge.      Conjunctiva/sclera: Conjunctivae normal.      Right eye: No exudate.     Left eye: No " exudate.  Neck:      Musculoskeletal: Normal range of motion. No edema.      Thyroid: No thyroid mass or thyromegaly.      Vascular: No carotid bruit.      Trachea: Trachea normal.   Cardiovascular:      Rate and Rhythm: Regular rhythm.      Pulses: Normal pulses.      Heart sounds: Normal heart sounds. No murmur.   Pulmonary:      Effort: No respiratory distress.      Breath sounds: Normal breath sounds. No decreased breath sounds, wheezing, rhonchi or rales.   Abdominal:      General: Bowel sounds are normal.      Palpations: Abdomen is soft.      Tenderness: There is no abdominal tenderness.   Musculoskeletal:      Comments: Trace of non-pitting edema in lower extremities   Lymphadenopathy:      Head:      Right side of head: No submental, submandibular, tonsillar, preauricular, posterior auricular or occipital adenopathy.      Left side of head: No submental, submandibular, tonsillar, preauricular, posterior auricular or occipital adenopathy.   Skin:     General: Skin is warm and dry.      Nails: There is no clubbing.     Neurological:      Mental Status: He is alert.   Psychiatric:         Behavior: Behavior is cooperative.        Result Review :   The following data was reviewed by: ASHWIN Ward on 02/10/2021:  Common labs    Common Labsle 8/28/20 8/28/20 8/28/20 8/28/20 8/28/20 12/31/20 12/31/20    1112 1112 1112 1112 1112 1207 1207   Glucose  103 (A)     105 (A)   BUN  12     6   Creatinine  0.89     0.97   eGFR Non  Am  98     89   eGFR  Am  119     108   Sodium  137     138   Potassium  4.5     4.3   Chloride  99     101   Calcium  9.9     8.8   Total Protein  6.9     6.3   Albumin  4.60     3.90   Total Bilirubin  0.4     0.5   Alkaline Phosphatase  72     60   AST (SGOT)  63 (A)     60 (A)   ALT (SGPT)  95 (A)     91 (A)   WBC     6.27     Hemoglobin     14.5     Hematocrit     43.5     Platelets     261     Total Cholesterol   154       Triglycerides   197 (A)       HDL  Cholesterol   44       LDL Cholesterol    71       Hemoglobin A1C 10.60 (A)     9.60 (A)    Microalbumin, Urine    141.6      (A) Abnormal value       Comments are available for some flowsheets but are not being displayed.                     Assessment and Plan    Diagnoses and all orders for this visit:    1. Viral URI (Primary)  -     guaiFENesin (Mucinex) 600 MG 12 hr tablet; Take 1 tablet by mouth Every 12 (Twelve) Hours for 7 days.  Dispense: 14 tablet    2. Shortness of breath  -     XR Chest 2 View    3. Suspected COVID-19 virus infection  -     COVID-19,[facility] ROUTINE, NP/OP SWAB IN TRANSPORT MEDIA OR ESWAB 72 HR TAT - Swab, Oropharynx; Future  -     COVID-19,[facility] ROUTINE, NP/OP SWAB IN TRANSPORT MEDIA OR ESWAB 72 HR TAT - Swab, Oropharynx    1-3. URI with shortness of breath-sx due to viral illness? No acute abnl on CXR (no previous films for comparison)-will send to radiology for review. He may take Mucinex 600mg bid for cough and continue to monitor. Swab taken for COVID-19, advised to self-quarantine until results are received.  4. Lower extremities edema-edema is non-pitting, discussed elevating legs and monitoring salt intake. He sits for a long period of time at work. No signs of CHF, consider further evaluation if sx persist/worsen.      Follow Up   Return if symptoms worsen or fail to improve, for Next scheduled follow up.  Patient was given instructions and counseling regarding his condition or for health maintenance advice. Please see specific information pulled into the AVS if appropriate.

## 2021-02-16 ENCOUNTER — HOSPITAL ENCOUNTER (EMERGENCY)
Facility: HOSPITAL | Age: 36
Discharge: HOME | End: 2021-02-16
Attending: EMERGENCY MEDICINE | Admitting: EMERGENCY MEDICINE

## 2021-02-16 ENCOUNTER — TELEPHONE (OUTPATIENT)
Dept: INTERNAL MEDICINE | Facility: CLINIC | Age: 36
End: 2021-02-16

## 2021-02-16 ENCOUNTER — APPOINTMENT (OUTPATIENT)
Dept: GENERAL RADIOLOGY | Facility: HOSPITAL | Age: 36
End: 2021-02-16

## 2021-02-16 VITALS
SYSTOLIC BLOOD PRESSURE: 150 MMHG | DIASTOLIC BLOOD PRESSURE: 93 MMHG | OXYGEN SATURATION: 100 % | TEMPERATURE: 97.6 F | HEART RATE: 116 BPM | RESPIRATION RATE: 16 BRPM

## 2021-02-16 DIAGNOSIS — R06.00 DYSPNEA, UNSPECIFIED TYPE: ICD-10-CM

## 2021-02-16 DIAGNOSIS — R73.9 HYPERGLYCEMIA: ICD-10-CM

## 2021-02-16 DIAGNOSIS — U07.1 COVID-19: Primary | ICD-10-CM

## 2021-02-16 LAB
ALBUMIN SERPL-MCNC: 4.2 G/DL (ref 3.5–5.2)
ALBUMIN/GLOB SERPL: 1.3 G/DL
ALP SERPL-CCNC: 66 U/L (ref 39–117)
ALT SERPL W P-5'-P-CCNC: 59 U/L (ref 1–41)
ANION GAP SERPL CALCULATED.3IONS-SCNC: 10.2 MMOL/L (ref 5–15)
AST SERPL-CCNC: 42 U/L (ref 1–40)
BASOPHILS # BLD AUTO: 0.01 10*3/MM3 (ref 0–0.2)
BASOPHILS NFR BLD AUTO: 0.2 % (ref 0–1.5)
BILIRUB SERPL-MCNC: 0.7 MG/DL (ref 0–1.2)
BUN SERPL-MCNC: 9 MG/DL (ref 6–20)
BUN/CREAT SERPL: 9 (ref 7–25)
CALCIUM SPEC-SCNC: 9.3 MG/DL (ref 8.6–10.5)
CHLORIDE SERPL-SCNC: 97 MMOL/L (ref 98–107)
CO2 SERPL-SCNC: 26.8 MMOL/L (ref 22–29)
CREAT SERPL-MCNC: 1 MG/DL (ref 0.76–1.27)
DEPRECATED RDW RBC AUTO: 41.7 FL (ref 37–54)
EOSINOPHIL # BLD AUTO: 0.02 10*3/MM3 (ref 0–0.4)
EOSINOPHIL NFR BLD AUTO: 0.4 % (ref 0.3–6.2)
ERYTHROCYTE [DISTWIDTH] IN BLOOD BY AUTOMATED COUNT: 13.6 % (ref 12.3–15.4)
GFR SERPL CREATININE-BSD FRML MDRD: 104 ML/MIN/1.73
GLOBULIN UR ELPH-MCNC: 3.2 GM/DL
GLUCOSE SERPL-MCNC: 157 MG/DL (ref 65–99)
HCT VFR BLD AUTO: 44.1 % (ref 37.5–51)
HGB BLD-MCNC: 15.3 G/DL (ref 13–17.7)
IMM GRANULOCYTES # BLD AUTO: 0.01 10*3/MM3 (ref 0–0.05)
IMM GRANULOCYTES NFR BLD AUTO: 0.2 % (ref 0–0.5)
LYMPHOCYTES # BLD AUTO: 1.19 10*3/MM3 (ref 0.7–3.1)
LYMPHOCYTES NFR BLD AUTO: 23.1 % (ref 19.6–45.3)
MAGNESIUM SERPL-MCNC: 1.8 MG/DL (ref 1.6–2.6)
MCH RBC QN AUTO: 29.8 PG (ref 26.6–33)
MCHC RBC AUTO-ENTMCNC: 34.7 G/DL (ref 31.5–35.7)
MCV RBC AUTO: 86 FL (ref 79–97)
MONOCYTES # BLD AUTO: 0.69 10*3/MM3 (ref 0.1–0.9)
MONOCYTES NFR BLD AUTO: 13.4 % (ref 5–12)
NEUTROPHILS NFR BLD AUTO: 3.24 10*3/MM3 (ref 1.7–7)
NEUTROPHILS NFR BLD AUTO: 62.7 % (ref 42.7–76)
NRBC BLD AUTO-RTO: 0 /100 WBC (ref 0–0.2)
NT-PROBNP SERPL-MCNC: <5 PG/ML (ref 0–450)
PLATELET # BLD AUTO: 211 10*3/MM3 (ref 140–450)
PMV BLD AUTO: 9.7 FL (ref 6–12)
POTASSIUM SERPL-SCNC: 4.2 MMOL/L (ref 3.5–5.2)
PROCALCITONIN SERPL-MCNC: 0.23 NG/ML (ref 0–0.25)
PROT SERPL-MCNC: 7.4 G/DL (ref 6–8.5)
QT INTERVAL: 297 MS
RBC # BLD AUTO: 5.13 10*6/MM3 (ref 4.14–5.8)
SODIUM SERPL-SCNC: 134 MMOL/L (ref 136–145)
TROPONIN T SERPL-MCNC: <0.01 NG/ML (ref 0–0.03)
WBC # BLD AUTO: 5.16 10*3/MM3 (ref 3.4–10.8)

## 2021-02-16 PROCEDURE — 83735 ASSAY OF MAGNESIUM: CPT | Performed by: EMERGENCY MEDICINE

## 2021-02-16 PROCEDURE — 80053 COMPREHEN METABOLIC PANEL: CPT | Performed by: EMERGENCY MEDICINE

## 2021-02-16 PROCEDURE — 93010 ELECTROCARDIOGRAM REPORT: CPT | Performed by: INTERNAL MEDICINE

## 2021-02-16 PROCEDURE — 85025 COMPLETE CBC W/AUTO DIFF WBC: CPT | Performed by: EMERGENCY MEDICINE

## 2021-02-16 PROCEDURE — 84484 ASSAY OF TROPONIN QUANT: CPT | Performed by: EMERGENCY MEDICINE

## 2021-02-16 PROCEDURE — 99284 EMERGENCY DEPT VISIT MOD MDM: CPT

## 2021-02-16 PROCEDURE — 93005 ELECTROCARDIOGRAM TRACING: CPT | Performed by: EMERGENCY MEDICINE

## 2021-02-16 PROCEDURE — 84145 PROCALCITONIN (PCT): CPT | Performed by: EMERGENCY MEDICINE

## 2021-02-16 PROCEDURE — 71045 X-RAY EXAM CHEST 1 VIEW: CPT

## 2021-02-16 PROCEDURE — 83880 ASSAY OF NATRIURETIC PEPTIDE: CPT | Performed by: EMERGENCY MEDICINE

## 2021-02-16 NOTE — ED TRIAGE NOTES
Was dx c covid on Thursday and is soa    Patient was placed in face mask during first look triage.  Patient was wearing a face mask throughout encounter.  I wore personal protective equipment throughout the encounter.  Hand hygiene was performed before and after patient encounter.

## 2021-02-16 NOTE — TELEPHONE ENCOUNTER
PATIENT CALLED STATING THAT HE TESTED POSITIVE FOR COVID. PATIENT HAS BODY ACHES, FATIGUE, LOST OF TASTE AND SMELL, SHORTNESS OF AIR, COUGH.  PATIENT WANTS TO KNOW IF HE NEEDS TO GO TO THE EMERGENCY ROOM OR WHAT HE NEEDS TO DO.  PATIENT ASKED FOR GUIDANCE. PATIENT ALSO ASKED ABOUT AN ANTIBODY COCKTAIL INFUSION.    PATIENT'S CONTACT [phone]

## 2021-02-16 NOTE — ED PROVIDER NOTES
EMERGENCY DEPARTMENT ENCOUNTER  Patient was placed in face mask in first look and the following protective measures were taken unless additional measures were taken and documented below in the ED course. Patient was wearing facemask when I entered the room and throughout our encounter. I wore full protective equipment throughout this patient encounter including a face mask, and gloves. Hand hygiene was performed before donning protective equipment and after removal when leaving the room.    Room Number:  38/38  Date of encounter:  2/16/2021  PCP: Brie Sifuentes APRN    HPI:  Context: Sahnae Lockwood is a 35 y.o. male who presents to the ED c/o chief complaint of shortness of breath.  Patient reports that he tested positive for Covid on Thursday, was asymptomatic at that time was just getting tested secondary to his girlfriend having pneumonia.  Patient complains of productive cough.  Patient endorses loss of sense of smell and taste.  Patient complains of shortness of breath that only occurs at nighttime.  Patient reports that when he wakes up in the morning he has to cough multiple times to clear out the mucus, then denies any shortness of breath during the daytime.  Patient denies any vomiting, has been having diarrhea, only one episode today, 5 episodes last night.  Patient reports diarrhea has no blood or mucus.  Patient complains of intermittent crampy abdominal pain, migratory, no abdominal pain at present.  No dysuria, hematuria, increased urinary frequency or urgency.  Patient denies any fevers but has had shakes and chills, no night sweats.  Patient also endorses body aches.    MEDICAL HISTORY REVIEW  Reviewed in EPIC    PAST MEDICAL HISTORY  Active Ambulatory Problems     Diagnosis Date Noted   • Erectile dysfunction 08/30/2020   • Uncontrolled type 2 diabetes mellitus with hyperglycemia (CMS/[facility]) 09/26/2020   • Low testosterone 09/26/2020   • Gastroesophageal reflux disease 12/29/2020     Resolved  Ambulatory Problems     Diagnosis Date Noted   • No Resolved Ambulatory Problems     No Additional Past Medical History       PAST SURGICAL HISTORY  No past surgical history on file.    FAMILY HISTORY  Family History   Problem Relation Age of Onset   • Diabetes type II Father    • Diabetes type II Brother         now controlled with diet       SOCIAL HISTORY  Social History     Socioeconomic History   • Marital status: Single     Spouse name: Not on file   • Number of children: Not on file   • Years of education: Not on file   • Highest education level: Not on file   Tobacco Use   • Smoking status: Never Smoker   • Smokeless tobacco: Never Used   Substance and Sexual Activity   • Alcohol use: Yes     Comment: occasional       ALLERGIES  Patient has no known allergies.    The patient's allergies have been reviewed    REVIEW OF SYSTEMS  All systems reviewed and negative except for those discussed in HPI.     PHYSICAL EXAM  I have reviewed the triage vital signs and nursing notes.  ED Triage Vitals [02/16/21 1255]   Temp Heart Rate Resp BP SpO2   97.6 °F (36.4 °C) 116 16 -- 98 %      Temp src Heart Rate Source Patient Position BP Location FiO2 (%)   Tympanic Monitor -- -- --     General: No acute distress, well-appearing, speaking full sentences, maintaining oxygen saturation on room air without difficulty  HENT: NCAT, PERRL, Nares patent  Eyes: no scleral icterus  Neck: trachea midline, no ROM limitations  CV: regular rhythm, regular rate  Respiratory: normal effort, CTAB, no wheezing rales or rhonchi  Abdomen: soft, nondistended, nontender to palpation, no rebound tenderness, no guarding or rigidity  : deferred  Musculoskeletal: no deformity  Neuro: alert, moves all extremities, follows commands  Skin: warm, dry, no peripheral edema    LAB RESULTS  Recent Results (from the past 24 hour(s))   ECG 12 Lead    Collection Time: 02/16/21  1:11 PM   Result Value Ref Range    QT Interval 297 ms   Comprehensive Metabolic  Panel    Collection Time: 02/16/21  1:21 PM    Specimen: Blood   Result Value Ref Range    Glucose 157 (H) 65 - 99 mg/dL    BUN 9 6 - 20 mg/dL    Creatinine 1.00 0.76 - 1.27 mg/dL    Sodium 134 (L) 136 - 145 mmol/L    Potassium 4.2 3.5 - 5.2 mmol/L    Chloride 97 (L) 98 - 107 mmol/L    CO2 26.8 22.0 - 29.0 mmol/L    Calcium 9.3 8.6 - 10.5 mg/dL    Total Protein 7.4 6.0 - 8.5 g/dL    Albumin 4.20 3.50 - 5.20 g/dL    ALT (SGPT) 59 (H) 1 - 41 U/L    AST (SGOT) 42 (H) 1 - 40 U/L    Alkaline Phosphatase 66 39 - 117 U/L    Total Bilirubin 0.7 0.0 - 1.2 mg/dL    eGFR  African Amer 104 >60 mL/min/1.73    Globulin 3.2 gm/dL    A/G Ratio 1.3 g/dL    BUN/Creatinine Ratio 9.0 7.0 - 25.0    Anion Gap 10.2 5.0 - 15.0 mmol/L   Troponin    Collection Time: 02/16/21  1:21 PM    Specimen: Blood   Result Value Ref Range    Troponin T <0.010 0.000 - 0.030 ng/mL   BNP    Collection Time: 02/16/21  1:21 PM    Specimen: Blood   Result Value Ref Range    proBNP <5.0 0.0 - 450.0 pg/mL   Magnesium    Collection Time: 02/16/21  1:21 PM    Specimen: Blood   Result Value Ref Range    Magnesium 1.8 1.6 - 2.6 mg/dL   Procalcitonin    Collection Time: 02/16/21  1:21 PM    Specimen: Blood   Result Value Ref Range    Procalcitonin 0.23 0.00 - 0.25 ng/mL   CBC Auto Differential    Collection Time: 02/16/21  1:21 PM    Specimen: Blood   Result Value Ref Range    WBC 5.16 3.40 - 10.80 10*3/mm3    RBC 5.13 4.14 - 5.80 10*6/mm3    Hemoglobin 15.3 13.0 - 17.7 g/dL    Hematocrit 44.1 37.5 - 51.0 %    MCV 86.0 79.0 - 97.0 fL    MCH 29.8 26.6 - 33.0 pg    MCHC 34.7 31.5 - 35.7 g/dL    RDW 13.6 12.3 - 15.4 %    RDW-SD 41.7 37.0 - 54.0 fl    MPV 9.7 6.0 - 12.0 fL    Platelets 211 140 - 450 10*3/mm3    Neutrophil % 62.7 42.7 - 76.0 %    Lymphocyte % 23.1 19.6 - 45.3 %    Monocyte % 13.4 (H) 5.0 - 12.0 %    Eosinophil % 0.4 0.3 - 6.2 %    Basophil % 0.2 0.0 - 1.5 %    Immature Grans % 0.2 0.0 - 0.5 %    Neutrophils, Absolute 3.24 1.70 - 7.00 10*3/mm3     Lymphocytes, Absolute 1.19 0.70 - 3.10 10*3/mm3    Monocytes, Absolute 0.69 0.10 - 0.90 10*3/mm3    Eosinophils, Absolute 0.02 0.00 - 0.40 10*3/mm3    Basophils, Absolute 0.01 0.00 - 0.20 10*3/mm3    Immature Grans, Absolute 0.01 0.00 - 0.05 10*3/mm3    nRBC 0.0 0.0 - 0.2 /100 WBC       I ordered the above labs and reviewed the results.    RADIOLOGY  Xr Chest 1 View    Result Date: 2/16/2021  ONE VIEW PORTABLE CHEST  HISTORY: Cough and shortness of breath. Positive COVID-19 infection.  FINDINGS: The lungs are well-expanded and clear and the heart size is normal. There is no evidence of pneumonia.  This report was finalized on 2/16/2021 3:09 PM by Dr. Ovidio eHredia M.D.        I ordered the above noted radiological studies. I reviewed the images and results. I agree with the radiologist interpretation.    PROCEDURES  Procedures    MEDICATIONS GIVEN IN ER  Medications - No data to display    PROGRESS, DATA ANALYSIS, CONSULTS, AND MEDICAL DECISION MAKING  A complete history and physical exam have been performed.  All available laboratory and imaging results have been reviewed by myself prior to disposition.    MDM  After the initial H&P, I discussed pertinent information from history and physical exam with patient/family.  Discussed differential diagnosis.  Discussed plan for ED evaluation/work-up/treatment.  All questions answered.  Patient/family is agreeable with plan.  ED Course as of Feb 16 1517   Tue Feb 16, 2021   1258 I wore full protective equipment throughout this patient encounter including a CAPPR, gown and gloves. Hand hygiene was performed before donning protective equipment and after removal when leaving the room.        [JG]   1315 EKG independently viewed and contemporaneously interpreted by ED physician. Time: 1311.  Rate 106.  Interpretation: Sinus tachycardia, normal axis, normal QRS, no acute ST changes.    [JG]   1435 Patient is afebrile, was initially tachycardic, tachycardia has resolved.   Patient has no leukocytosis, chest x-ray is unremarkable, procalcitonin is normal.  EKG shows no acute findings, troponin is normal.  Lab work does show mild elevation of liver enzymes felt to be secondary to Covid infection.  Patient has no abdominal pain, benign abdominal exam.  Patient also has mild hyperglycemia, will refer to primary care for further evaluation.  Patient overall is well-appearing.  Given extensive discussion return precautions, quarantine precautions, discharging with primary care follow-up.    [JG]   1516 The patient was reexamined.  They have had symptomatic improvement during their ED stay.  I discussed today's findings with the patient, explaining the pertinent positives and negatives from today's visit, and the plan of care.  Discussed plan for discharge as there is no emergent indication for admission.  Discussed limitation of the ED work-up and that this is to rule out life-threatening emergencies but that they could require further testing as determined by their primary care and or any referred specialist patient is agreeable and understands need for follow-up and repeat exam/testing.  Patient is aware that discharge does not mean there is nothing wrong, indicates no emergency is present, and that they must continue their care with their primary care physician and/or any referred specialist.  They were given appropriate follow-up with their primary care physician and/or specialist.  I had an extensive discussion on the expected clinical course and return precautions.  Patient understands to return to the emergency department for continuation, worsening, or new symptoms.  I answered any of the patient's questions. Patient was discharged home in a stable condition.        [JG]      ED Course User Index  [JG] Chucho Musa MD       AS OF 15:17 EST VITALS:    BP - 171/100  HR - 116  TEMP - 97.6 °F (36.4 °C) (Tympanic)  O2 SATS - 98%    DIAGNOSIS  Final diagnoses:   COVID-19   Dyspnea,  unspecified type   Hyperglycemia         DISPOSITION  DISCHARGE    Patient discharged in stable condition.    Reviewed implications of results, diagnosis, meds, responsibility to follow up, warning signs and symptoms of possible worsening, potential complications and reasons to return to ER.    Patient/Family voiced understanding of above instructions.    Discussed plan for discharge, as there is no emergent indication for admission. Patient referred to primary care provider for BP management due to today's BP. Pt/family is agreeable and understands need for follow up and repeat testing.  Pt is aware that discharge does not mean that nothing is wrong but it indicates no emergency is present that requires admission and they must continue care with follow-up as given below or physician of their choice.     FOLLOW-UP  Brie Sifuentes, APRN  [address] [id number] [phone]    Schedule an appointment as soon as possible for a visit in 2 days           Medication List      New Prescriptions    Sudafed PE Head Congestion 5--325 MG tablet  Generic drug: Phenylephrine-DM-GG-APAP  Take 2 tablets by mouth Every 8 (Eight) Hours As Needed (cough/congestion).        Stop    guaiFENesin 600 MG 12 hr tablet  Commonly known as: Mucinex           Where to Get Your Medications      These medications were sent to [facility] [address] [place] [address] ([facility]), [place] - [address] [facility] - [phone]  - [phone] FX [address] [facility] ([facility]) [place] [address]    Phone: [phone]   · Sudafed PE Head Congestion 5--325 MG tablet          Chucho Musa MD  02/16/21 [id number]

## 2021-02-16 NOTE — DISCHARGE INSTRUCTIONS
You have been given emergency department evaluation.  This evaluation is intended to rule out life-threatening conditions.  Is not a complete evaluation.  You could require further testing as determined by your primary care physician or any referred specialist.  Please follow-up with all doctors that you are referred to.  Please be sure to take your prescribed medications and follow any specific instructions in the discharge instructions.  Please follow-up with your primary care physician within 48 hours.  Please have your primary care provider recheck your blood pressure.  Please return to the emergency department if you experience chest pain, shortness of breath, abdominal pain, fever greater than 102, intractable vomiting.  Please return to the emergency department if your symptoms continue or worsen, or if you begin to experience any other concerning symptom.    COVID-19 community information Self-quarantine instructions     What should I do if I’ve been told to self-quarantine?  The following instructions are provided to assist you to safely care for yourself or others who are infected or potentially infected with COVID-19. These instructions are also available at: https://www.cdc.gov/coronavirus/2019-ncov/hcp/guidance-prevent-spread.html.  Your health care provider and public health staff will evaluate whether you can be safely cared for at home. If it is determined that you do not need to be hospitalized and can be isolated at home, you will be monitored by staff from your local health department. You should follow the prevention steps below until a health care provider or local health department says you can return to your normal activities.  Stay home except to get medical care  You should restrict activities outside your home, except for getting medical care. Under no circumstance should you go to work, school, or public areas. Avoid using public transportation, ride sharing, or taxis.  Separate yourself  from other people and animals in your home  People: As much as possible, you should stay in a specific room and away from other people in your home. Also, you should use a separate bathroom, if available.   Animals: You should restrict contact with pets and other animals while you are sick with COVID-19, just like you would around other people. Although there have not been reports of pets or other animals becoming sick with COVID-19, it is still recommended that people with COVID-19 limit contact with animals until more information is known about the virus. When possible, have another member of your household care for your animals while you are sick. If you must care for your pet or be around animals while you are sick, wash your hands before and after you interact with pets and wear a facemask. See COVID-19 and Animals for more information.  Call ahead before visiting your doctor  If you have a medical appointment, call the health care provider prior to your appointment and tell them that you have or may have COVID-19. This will help the health care provider’s office take steps to keep other people from getting infected or exposed. Ask your health care provider to call the local or state health department. Persons who are placed under active monitoring or facilitated self-monitoring should follow instructions provided by their local health department or occupational health professionals, as appropriate. If you have a medical emergency and need to call 911, notify the dispatch personnel that you have, or are being evaluated for COVID-19. If possible, put on a facemask before emergency medical services arrive.  Take care of your mental health  You might be feeling anxious, afraid, lonely or uncertain. Download this guide for a list of helpful behavioral health resources, and a few tips for taking care of your emotional health while you're quarantined.   Wear a face mask  You should wear a facemask when you are around  other people (e.g., sharing a room or vehicle) or pets, and before you enter a health care provider’s office. If you are not able to wear a facemask (for example, because it causes trouble breathing), then people who live with you should not stay in the same room with you, or they should wear a facemask if they enter your room.  Cover your coughs and sneezes  Cover your mouth and nose with a tissue when you cough or sneeze. Throw used tissues in a lined trash can.  Clean your hands often  Wash your hands often with soap and water for at least 20 seconds or clean your hands with an alcohol-based hand  that contains 60 to 95% alcohol, covering all surfaces of your hands and rubbing them together until they feel dry. Soap and water should be used preferentially if hands are visibly dirty. Avoid touching your eyes, nose, and mouth with unwashed hands.  Clean all high-touch surfaces every day  Clean all high-touch surfaces daily. High-touch surfaces include counters, tabletops, doorknobs, bathroom fixtures, toilets, phones, keyboards, tablets, and bedside tables. Also, clean any surfaces that may have blood, stool, or body fluids on them. Use a household cleaning spray or wipe, according to the label instructions. Labels contain instructions for safe and effective use of the cleaning product including precautions you should take when applying the product, such as wearing gloves and making sure you have good ventilation during use of the product.  Avoid sharing personal household items  You should not share dishes, drinking glasses, cups, eating utensils, towels or bedding with other people or pets in your home. After using these items, they should be washed thoroughly with soap and water.  Monitor your symptoms  Please contact your local or state health department as soon as possible. Persons who are placed under active monitoring or facilitated self-monitoring should follow instructions provided by their local  health department or occupational health professionals, as appropriate. View local health department contact information here.  Seek prompt medical attention if your illness is worsening (e.g., difficulty breathing). Before seeking care, call your health care provider and tell them that you have, or are being evaluated for COVID-19. Put on a facemask before you enter the facility. These steps will help the health care provider’s office to keep other people in the office or waiting room from getting infected or exposed.   If you have a medical emergency and need to call 911, notify the dispatch personnel that you have, or are being evaluated for COVID-19. If possible, put on a face mask before emergency medical services arrive.  Discontinuing home isolation  Patients with confirmed COVID-19 should remain under home isolation precautions until the risk of secondary transmission to others is thought to be low. The decision to discontinue home isolation precautions is made on a case-by-case basis, in consultation with health care providers, and state and local health departments.  If you are providing care for a person infected or suspected to be infected with COVID-19, please note the following. These instructions are also available at: https://www.cdc.gov/coronavirus/2019-ncov/hcp/guidance-prevent-spread.html.  How do I take care of someone who's quarantined in my home?  If you are providing care for a person infected or suspected to be infected with COVID-19, please note the following. These instructions are also available at CDC.gov.  Household members, intimate partners, and caregivers in a non-health care setting may have close contact (within 6 feet) with a person with symptomatic, laboratory-confirmed COVID-19 or a person under investigation. Those in close contact should monitor their health and should call their health care provider right away if they develop symptoms suggestive of COVID-19 (e.g., fever,  cough, shortness of breath).   Those in close contacts should also follow these recommendations:  Make sure that you understand and can help the patient follow their health care provider’s instructions for medication(s) and care. You should help the patient with basic needs in the home and provide support for getting groceries, prescriptions and other personal needs   Monitor the patient’s symptoms. If the patient is getting sicker, call his or her health care provider and tell them that the patient has laboratory-confirmed or is under investigation for COVID-19. This will help the health care provider’s office take steps to keep other people in the office or waiting room from getting infected. Ask the health care provider to call the local or state health department for additional guidance. If the patient has a medical emergency and you need to call 911, notify the dispatch personnel that the patient has, or is being evaluated for COVID-19   Household members should stay in another room or be  from the patient as much as possible. Household members should use a separate bedroom and bathroom, if available   Prohibit visitors who do not have an essential need to be in the home   Household members should care for any pets in the home. Do not handle pets or other animals while sick   Make sure that shared spaces in the home have good airflow, such as by an air conditioner or an opened window, weather permitting   Perform hand hygiene frequently. Wash your hands often with soap and water for at least 20 seconds or use an alcohol-based hand  that contains 60 to 95% alcohol, covering all surfaces of your hands and rubbing them together until they feel dry. Soap and water should be used preferentially if hands are visibly dirty   Avoid touching your eyes, nose, and mouth with unwashed hands   You and the patient should wear a facemask if you are in the same room   Wear a disposable facemask and gloves  when you touch or have contact with the patient’s blood, stool, or body fluids, such as saliva, sputum, nasal mucus, vomit or urine.   Do not reuse disposable facemasks and gloves. Throw them away after using them.   When removing personal protective equipment, first remove and dispose of gloves. Then, immediately clean your hands with soap and water or alcohol-based hand . Next, remove and dispose of facemask, and immediately clean your hands again with soap and water or alcohol-based hand   Avoid sharing household items with the patient. You should not share dishes, drinking glasses, cups, eating utensils, towels, bedding or other items. After the patient uses these items, you should wash them thoroughly (see below “Wash laundry thoroughly”)   Clean all high-touch surfaces, such as counters, tabletops, doorknobs, bathroom fixtures, toilets, phones, keyboards, tablets and bedside tables, every day. Also, clean any surfaces that may have blood, stool, or body fluids on them   Use a household cleaning spray or wipe, according to the label instructions. Labels contain instructions for safe and effective use of the cleaning product including precautions you should take when applying the product, such as wearing gloves and making sure you have good ventilation during the use of the product  Wash laundry thoroughly   Immediately remove and wash clothes or bedding that have blood, stool, or body fluids on them   Wear disposable gloves while handling soiled items and keep soiled items away from your body. Clean your hands (with soap and water or an alcohol-based hand ) immediately after removing and throwing away your gloves   Read and follow directions on labels of laundry or clothing items and detergent. In general, using a normal laundry detergent according to washing machine instructions and dry thoroughly using the warmest temperatures recommended on the clothing label  Place all used  "disposable gloves, facemasks, and other contaminated items in a lined container before disposing of them with other household waste. Clean your hands (with soap and water or an alcohol-based hand ) immediately after handling these items. Soap and water should be used preferentially if hands are visibly dirty   Discuss any additional questions with your state or local health department or health care provider  What if I live with someone who's been told to self-quarantine?  If the person you live with is NOT exhibiting respiratory symptoms, you can go about your day-to-day business, and you do not need to be tested or monitored.   If the person you live with is exhibiting respiratory symptoms, but has not yet tested positive for COVID-19:   Please make sure to stay home   Monitor your symptoms closely, and seek medical attention if your symptoms are worsening   Avoid public areas and public transportation   Wear a facemask if you are sick   Cover your coughs and sneezes with a tissue, dispose of the tissue and immediately wash your hands   Wash your hands often for at least 20 seconds, and if soap and water are not available, use hand    Avoid touching your eyes, nose or mouth   Avoid sharing personal household items   Clean \"high-touch\" surfaces daily  If the person you live with has tested positive for COVID-19, you will be considered a close contact, and will also likely be asked to self-quarantine.    "

## 2021-02-22 ENCOUNTER — TELEMEDICINE (OUTPATIENT)
Dept: INTERNAL MEDICINE | Facility: CLINIC | Age: 36
End: 2021-02-22

## 2021-02-22 DIAGNOSIS — U07.1 COVID-19: Primary | ICD-10-CM

## 2021-02-22 PROCEDURE — 99213 OFFICE O/P EST LOW 20 MIN: CPT | Performed by: NURSE PRACTITIONER

## 2021-02-22 NOTE — PROGRESS NOTES
"Missy Lockwood is a 35 y.o. male who presents for a telephone visit to f/u on COVID-19.    You have chosen to receive care through a telephone visit. Do you consent to use a telephone visit for your medical care today? Yes    He tested positive for COVID-19 2/10/2021, presented to the ER 2/16 du to c/o dyspnea. His CXR did not show any acute abnormalities. He was started on Sudafed PE which he tolerated well. He reports feeling better with decreased chest tightness and shortness of breath, still with intermittent dry cough. Denies fever or chills.       The following portions of the patient's history were reviewed and updated as appropriate: allergies, current medications, past social history and problem list.    No past medical history on file.      Current Outpatient Medications:   •  Insulin NPH Human, Isophane, (NOVOLIN N SC), Inject 100 Units under the skin into the appropriate area as directed Daily With Breakfast, Lunch & Dinner., Disp: , Rfl:   •  Insulin Regular Human (NOVOLIN R IJ), Inject 100 Units as directed 2 (two) times a day., Disp: , Rfl:   •  lisinopril (PRINIVIL,ZESTRIL) 5 MG tablet, Take 1 tablet by mouth Daily., Disp: 90 tablet, Rfl: 1  •  metFORMIN ER (GLUCOPHAGE-XR) 500 MG 24 hr tablet, Take 1 tablet by mouth 2 (two) times a day., Disp: 180 tablet, Rfl: 1  •  Needle, Disp, 21G X 1\" misc, 1 Units Every 14 (Fourteen) Days., Disp: 50 each, Rfl: 1  •  omeprazole (priLOSEC) 40 MG capsule, Take 1 capsule by mouth Daily., Disp: 90 capsule, Rfl: 2  •  Phenylephrine-DM-GG-APAP (Sudafed PE Head Congestion) 5--325 MG tablet, Take 2 tablets by mouth Every 8 (Eight) Hours As Needed (cough/congestion)., Disp: 21 each, Rfl: 0  •  testosterone cypionate (Depo-Testosterone) 100 MG/ML solution injection, Inject 2 mL into the appropriate muscle as directed by prescriber Every 14 (Fourteen) Days., Disp: 10 mL, Rfl: 0  •  benzonatate (TESSALON) 200 MG capsule, Take 1 capsule by mouth 3 (Three) Times " a Day As Needed for Cough., Disp: 30 capsule, Rfl: 0    No Known Allergies    Review of Systems   Constitutional: Positive for fatigue. Negative for activity change, appetite change, chills, fever and unexpected weight change.   HENT: Positive for congestion, postnasal drip, rhinorrhea, sinus pressure, sneezing and sore throat. Negative for drooling, ear pain, facial swelling, hearing loss, mouth sores, nosebleeds, trouble swallowing and voice change.    Eyes: Negative for pain, discharge, itching and visual disturbance.   Respiratory: Positive for cough and chest tightness. Negative for choking, shortness of breath and wheezing.    Cardiovascular: Negative for chest pain and palpitations.   Gastrointestinal: Negative for abdominal pain, constipation, diarrhea, nausea and vomiting.   Endocrine: Negative for polyuria.   Genitourinary: Negative for dysuria and frequency.   Musculoskeletal: Negative for back pain and joint swelling.       Objective   There were no vitals filed for this visit.  There is no height or weight on file to calculate BMI.  Physical Exam  Psychiatric:         Behavior: Behavior normal.         Thought Content: Thought content normal.         Judgment: Judgment normal.         Assessment/Plan   Diagnoses and all orders for this visit:    1. COVID-19 (Primary)    He reports feeling better other than lingering dry cough. He may take Claritin as needed. He is cleared to return to work this week as tolerated, notes lingering fatigue.    History and medical judgement obtained from phone conversation with patient. No physical examination was performed. Approximately 15 minutes spent in phone conversation with patient.

## 2021-02-23 DIAGNOSIS — U07.1 COVID-19: Primary | ICD-10-CM

## 2021-02-23 RX ORDER — BENZONATATE 200 MG/1
200 CAPSULE ORAL 3 TIMES DAILY PRN
Qty: 30 CAPSULE | Refills: 0 | Status: SHIPPED | OUTPATIENT
Start: 2021-02-23 | End: 2021-03-30

## 2021-03-07 DIAGNOSIS — R79.89 LOW TESTOSTERONE: ICD-10-CM

## 2021-03-08 RX ORDER — TESTOSTERONE CYPIONATE 100 MG/ML
200 INJECTION, SOLUTION INTRAMUSCULAR
Qty: 10 ML | Refills: 1 | Status: SHIPPED | OUTPATIENT
Start: 2021-03-08 | End: 2021-06-03 | Stop reason: SDUPTHER

## 2021-03-30 ENCOUNTER — OFFICE VISIT (OUTPATIENT)
Dept: INTERNAL MEDICINE | Facility: CLINIC | Age: 36
End: 2021-03-30

## 2021-03-30 VITALS
SYSTOLIC BLOOD PRESSURE: 128 MMHG | TEMPERATURE: 97.8 F | WEIGHT: 315 LBS | HEIGHT: 71 IN | RESPIRATION RATE: 16 BRPM | BODY MASS INDEX: 44.1 KG/M2 | HEART RATE: 110 BPM | DIASTOLIC BLOOD PRESSURE: 80 MMHG | OXYGEN SATURATION: 99 %

## 2021-03-30 DIAGNOSIS — E11.65 UNCONTROLLED TYPE 2 DIABETES MELLITUS WITH HYPERGLYCEMIA (HCC): Primary | ICD-10-CM

## 2021-03-30 DIAGNOSIS — K21.9 GASTROESOPHAGEAL REFLUX DISEASE, UNSPECIFIED WHETHER ESOPHAGITIS PRESENT: ICD-10-CM

## 2021-03-30 DIAGNOSIS — R06.83 SNORING: ICD-10-CM

## 2021-03-30 DIAGNOSIS — E66.01 CLASS 3 SEVERE OBESITY DUE TO EXCESS CALORIES WITH SERIOUS COMORBIDITY AND BODY MASS INDEX (BMI) OF 45.0 TO 49.9 IN ADULT (HCC): ICD-10-CM

## 2021-03-30 PROCEDURE — 99214 OFFICE O/P EST MOD 30 MIN: CPT | Performed by: NURSE PRACTITIONER

## 2021-03-31 PROBLEM — R06.83 SNORING: Status: ACTIVE | Noted: 2021-03-31

## 2021-03-31 PROBLEM — E66.01 CLASS 3 SEVERE OBESITY DUE TO EXCESS CALORIES WITH SERIOUS COMORBIDITY AND BODY MASS INDEX (BMI) OF 45.0 TO 49.9 IN ADULT (HCC): Chronic | Status: ACTIVE | Noted: 2021-03-31

## 2021-03-31 PROBLEM — E66.01 CLASS 3 SEVERE OBESITY DUE TO EXCESS CALORIES WITH SERIOUS COMORBIDITY AND BODY MASS INDEX (BMI) OF 45.0 TO 49.9 IN ADULT (HCC): Status: ACTIVE | Noted: 2021-03-31

## 2021-03-31 PROBLEM — N52.9 ERECTILE DYSFUNCTION: Chronic | Status: ACTIVE | Noted: 2020-08-30

## 2021-03-31 PROBLEM — R79.89 LOW TESTOSTERONE: Chronic | Status: ACTIVE | Noted: 2020-09-26

## 2021-03-31 PROBLEM — K21.9 GASTROESOPHAGEAL REFLUX DISEASE: Chronic | Status: ACTIVE | Noted: 2020-12-29

## 2021-03-31 PROBLEM — R06.83 SNORING: Chronic | Status: ACTIVE | Noted: 2021-03-31

## 2021-03-31 PROBLEM — E11.65 UNCONTROLLED TYPE 2 DIABETES MELLITUS WITH HYPERGLYCEMIA (HCC): Chronic | Status: ACTIVE | Noted: 2020-09-26

## 2021-03-31 LAB
ALBUMIN SERPL-MCNC: 4.6 G/DL (ref 3.5–5.2)
ALBUMIN/GLOB SERPL: 1.5 G/DL
ALP SERPL-CCNC: 79 U/L (ref 39–117)
ALT SERPL-CCNC: 68 U/L (ref 1–41)
AST SERPL-CCNC: 42 U/L (ref 1–40)
BILIRUB SERPL-MCNC: 0.8 MG/DL (ref 0–1.2)
BUN SERPL-MCNC: 9 MG/DL (ref 6–20)
BUN/CREAT SERPL: 9.4 (ref 7–25)
CALCIUM SERPL-MCNC: 9.7 MG/DL (ref 8.6–10.5)
CHLORIDE SERPL-SCNC: 96 MMOL/L (ref 98–107)
CHOLEST SERPL-MCNC: 149 MG/DL (ref 0–200)
CO2 SERPL-SCNC: 28.8 MMOL/L (ref 22–29)
CREAT SERPL-MCNC: 0.96 MG/DL (ref 0.76–1.27)
GLOBULIN SER CALC-MCNC: 3.1 GM/DL
GLUCOSE SERPL-MCNC: 171 MG/DL (ref 65–99)
HBA1C MFR BLD: 9.3 % (ref 4.8–5.6)
HDLC SERPL-MCNC: 34 MG/DL (ref 40–60)
LDLC SERPL CALC-MCNC: 73 MG/DL (ref 0–100)
POTASSIUM SERPL-SCNC: 4.1 MMOL/L (ref 3.5–5.2)
PROT SERPL-MCNC: 7.7 G/DL (ref 6–8.5)
SODIUM SERPL-SCNC: 137 MMOL/L (ref 136–145)
TRIGL SERPL-MCNC: 256 MG/DL (ref 0–150)
VLDLC SERPL CALC-MCNC: 42 MG/DL (ref 5–40)

## 2021-03-31 NOTE — ASSESSMENT & PLAN NOTE
He is currently using NPH Humulin and regular Humulin due to the cost of other insulins.  Check A1c today and titrate dosage if needed.

## 2021-03-31 NOTE — ASSESSMENT & PLAN NOTE
He complains of fatigue throughout the day with snoring, will send for sleep clinic evaluation for possible sleep apnea.

## 2021-03-31 NOTE — ASSESSMENT & PLAN NOTE
We discussed various options including gastric sleeve.  He is given information regarding Contrave and will call if he would like a prescription for this medication.

## 2021-03-31 NOTE — PROGRESS NOTES
"Chief Complaint  Diabetes (3 month follow up ), Low testosterone, and COVID-19    Subjective          Shanae Lockwood presents to [facility] for f/u regarding DM2, low testosterone and hx COVID-19 infection.    He was diagnosed with COVID-19 on February 10.  He presented to the ER February 16 due to worsening symptoms, chest x-ray did not show any acute abnormalities.  He is feeling better with an improvement in his energy and states his symptoms have resolved.  He continues on NPH insulin along with regular insulin for management of DM2.  He admits to not checking his glucose and regularly enough to do insulin on a sliding scale.  His glucose readings have been elevated, denies hypoglycemic episodes.  He is tolerating testosterone injections well and does report improvement in his energy.  He has lost 21 pounds since his last visit in February.  He does report decreased appetite with COVID-19.  He is interested in continue weight loss.  He has done well with a low-carb diet in the past.      Objective   Vital Signs:   /80 (BP Location: Left arm, Patient Position: Sitting, Cuff Size: Adult)   Pulse 110   Temp 97.8 °F (36.6 °C) (Temporal)   Resp 16   Ht 180.3 cm (71\")   Wt (!) 159 kg (350 lb 12.8 oz)   SpO2 99%   BMI 48.93 kg/m²     Physical Exam  Constitutional:       Appearance: He is well-developed. He is not ill-appearing.   HENT:      Head: Normocephalic.      Right Ear: Hearing, tympanic membrane and external ear normal.      Left Ear: Hearing, tympanic membrane and external ear normal.      Nose: Nose normal. No nasal deformity, mucosal edema or rhinorrhea.      Right Sinus: No maxillary sinus tenderness or frontal sinus tenderness.      Left Sinus: No maxillary sinus tenderness or frontal sinus tenderness.      Mouth/Throat:      Dentition: Normal dentition.   Eyes:      General: Lids are normal.         Right eye: No discharge.         Left eye: No discharge.      " Conjunctiva/sclera: Conjunctivae normal.      Right eye: No exudate.     Left eye: No exudate.  Neck:      Thyroid: No thyroid mass or thyromegaly.      Vascular: No carotid bruit.      Trachea: Trachea normal.   Cardiovascular:      Rate and Rhythm: Regular rhythm.      Pulses: Normal pulses.      Heart sounds: Normal heart sounds. No murmur heard.     Pulmonary:      Effort: No respiratory distress.      Breath sounds: Normal breath sounds. No decreased breath sounds, wheezing, rhonchi or rales.   Abdominal:      General: Bowel sounds are normal.      Palpations: Abdomen is soft.      Tenderness: There is no abdominal tenderness.   Musculoskeletal:      Cervical back: Normal range of motion. No edema.   Lymphadenopathy:      Head:      Right side of head: No submental, submandibular, tonsillar, preauricular, posterior auricular or occipital adenopathy.      Left side of head: No submental, submandibular, tonsillar, preauricular, posterior auricular or occipital adenopathy.   Skin:     General: Skin is warm and dry.      Nails: There is no clubbing.   Neurological:      Mental Status: He is alert.   Psychiatric:         Behavior: Behavior is cooperative.        Result Review :   The following data was reviewed by: ASHWIN Ward on 03/30/2021:  Common labs    Common Labsle 12/31/20 12/31/20 2/16/21 2/16/21 3/30/21 3/30/21 3/30/21    1207 1207 1321 1321 1513 1513 1513   Glucose    157 (A)      Glucose  105 (A)    171 (A)    BUN  6  9  9    Creatinine  0.97  1.00  0.96    eGFR Non  Am  89    90    eGFR  Am  108  104  109    Sodium  138  134 (A)  137    Potassium  4.3  4.2  4.1    Chloride  101  97 (A)  96 (A)    Calcium  8.8  9.3  9.7    Total Protein  6.3    7.7    Albumin  3.90  4.20  4.60    Total Bilirubin  0.5  0.7  0.8    Alkaline Phosphatase  60  66  79    AST (SGOT)  60 (A)  42 (A)  42 (A)    ALT (SGPT)  91 (A)  59 (A)  68 (A)    WBC   5.16       Hemoglobin   15.3       Hematocrit   44.1        Platelets   211       Total Cholesterol       149   Triglycerides       256 (A)   HDL Cholesterol       34 (A)   LDL Cholesterol        73   Hemoglobin A1C 9.60 (A)    9.30 (A)     (A) Abnormal value       Comments are available for some flowsheets but are not being displayed.           Data reviewed: Recent hospitalization notes ER notes/chest xray 2/16          Assessment and Plan    Diagnoses and all orders for this visit:    1. Uncontrolled type 2 diabetes mellitus with hyperglycemia (CMS/[facility]) (Primary)  Assessment & Plan:  He is currently using NPH Humulin and regular Humulin due to the cost of other insulins.  Check A1c today and titrate dosage if needed.    Orders:  -     Hemoglobin A1c  -     Comprehensive Metabolic Panel  -     Lipid Panel    2. Gastroesophageal reflux disease, unspecified whether esophagitis present  Assessment & Plan:  Symptoms improved and controlled with daily omeprazole      3. Snoring  Assessment & Plan:  He complains of fatigue throughout the day with snoring, will send for sleep clinic evaluation for possible sleep apnea.    Orders:  -     Ambulatory Referral to Sleep Medicine    4. Class 3 severe obesity due to excess calories with serious comorbidity and body mass index (BMI) of 45.0 to 49.9 in adult (CMS/[facility])  Assessment & Plan:  We discussed various options including gastric sleeve.  He is given information regarding Contrave and will call if he would like a prescription for this medication.      Other orders  -     Lipid Panel      Follow Up   Return in about 3 months (around 6/30/2021).  Patient was given instructions and counseling regarding his condition or for health maintenance advice. Please see specific information pulled into the AVS if appropriate.

## 2021-04-16 ENCOUNTER — BULK ORDERING (OUTPATIENT)
Dept: CASE MANAGEMENT | Facility: OTHER | Age: 36
End: 2021-04-16

## 2021-04-16 DIAGNOSIS — Z23 IMMUNIZATION DUE: ICD-10-CM

## 2021-05-06 ENCOUNTER — APPOINTMENT (OUTPATIENT)
Dept: SLEEP MEDICINE | Facility: HOSPITAL | Age: 36
End: 2021-05-06

## 2021-06-03 DIAGNOSIS — R79.89 LOW TESTOSTERONE: ICD-10-CM

## 2021-06-04 RX ORDER — TESTOSTERONE CYPIONATE 100 MG/ML
200 INJECTION, SOLUTION INTRAMUSCULAR
Qty: 10 ML | Refills: 1 | Status: SHIPPED | OUTPATIENT
Start: 2021-06-04 | End: 2021-12-05 | Stop reason: SDUPTHER

## 2021-10-20 ENCOUNTER — E-VISIT (OUTPATIENT)
Dept: ADMINISTRATIVE | Facility: OTHER | Age: 36
End: 2021-10-20

## 2021-10-21 ENCOUNTER — TELEMEDICINE (OUTPATIENT)
Dept: INTERNAL MEDICINE | Facility: CLINIC | Age: 36
End: 2021-10-21

## 2021-10-21 DIAGNOSIS — R05.9 COUGH: ICD-10-CM

## 2021-10-21 DIAGNOSIS — Z20.828 EXPOSURE TO THE FLU: Primary | ICD-10-CM

## 2021-10-21 PROCEDURE — 99212 OFFICE O/P EST SF 10 MIN: CPT | Performed by: FAMILY MEDICINE

## 2021-10-21 RX ORDER — OSELTAMIVIR PHOSPHATE 75 MG/1
75 CAPSULE ORAL DAILY
Qty: 7 CAPSULE | Refills: 0 | Status: SHIPPED | OUTPATIENT
Start: 2021-10-21 | End: 2021-10-28

## 2021-10-21 RX ORDER — OSELTAMIVIR PHOSPHATE 75 MG/1
75 CAPSULE ORAL DAILY
Qty: 7 CAPSULE | Refills: 0 | Status: CANCELLED | OUTPATIENT
Start: 2021-10-21 | End: 2021-10-28

## 2021-10-21 NOTE — PROGRESS NOTES
Patient chose to receive care through a telehealth visit.  He consents to use a video/audio connection for his medical care today.     Assessment and Plan:     Problem List Items Addressed This Visit     None      Visit Diagnoses     Exposure to the flu    -  Primary    Relevant Medications    oseltamivir (Tamiflu) 75 MG capsule    Cough            His wife diagnosed with the flu yesterday. Pt has been to having concern for fatigue, sore throat and cough.  Sent script for Tamiflu to pharmacy. He wanting to take something before he starts to feel too bad.  Wife was having fevers.        Return in about 4 weeks (around 11/18/2021) for Recheck.  Patient was given instructions and counseling regarding his condition or for health maintenance advice. Please see specific information pulled into the AVS if appropriate.      Subjective:   Shanae Lockwood is a 36 y.o. male being seen on video today via Video Options: MyChart/Zoom for URI and influenza exposure               Social History  He  reports that he has never smoked. He has never used smokeless tobacco. He reports current alcohol use.    History of the Present Illness   HPI  36 male present for video visit concern for flu.  He states he was having sore throat this morning.  Nasal congestion and cough. No fever or headache. Feeling tired.  He states his wife was diagnosed yesterday with the flu.    Last night felt worse.  Taking vitamin supplements daily. Wife was having fevers.      His Temp today 98.6        Objective:    Physical Exam  Vitals reviewed.   Constitutional:       General: He is not in acute distress.     Appearance: He is obese.   HENT:      Head: Normocephalic.   Eyes:      Conjunctiva/sclera: Conjunctivae normal.   Cardiovascular:      Rate and Rhythm: Regular rhythm.      Heart sounds: Normal heart sounds.   Pulmonary:      Effort: Pulmonary effort is normal. No respiratory distress.      Breath sounds: Normal breath sounds. No wheezing.   Abdominal:       General: Bowel sounds are normal.      Palpations: Abdomen is soft.   Neurological:      Mental Status: He is alert.   Psychiatric:         Mood and Affect: Mood normal.

## 2021-10-21 NOTE — E-VISIT ESCALATED
"   Chief Complaint: Flu (influenza) exposure   Patient was shown the following message:   Antiviral medications aren't recommended   Your household contact has had flu symptoms for more than 48 hours, so antiviral medications to prevent the flu aren't recommended.   Antiviral medications work best at preventing the flu if they are started within 48 hours of flu exposure.   The best thing you can do for your health is get a flu vaccine as soon as possible. If you've already gotten a flu shot this season, that's great! The flu vaccine is the best way to lower your chances of getting the flu.   To stay healthy:    Wash your hands regularly with soap and water. Use alcohol-based hand sanitizers when soap and water aren't available.    Avoid close contact with sick people. If contact can't be avoided, wear a mask and avoid touching your eyes, nose, and mouth.   If you develop flu-like symptoms (fever, body aches, fatigue, cough, or headache):    Take the \"Coronavirus (COVID-19), cold, sinus pain, allergy, or flu\" interview OR    Speak with a provider as soon as possible   ----------   Patient Interview Transcript:   Have you been in close contact with someone who has the flu? Select all that apply.    I live with someone who has the flu   Not selected:    I care for someone who has the flu    I've talked face-to-face with someone who has the flu    None of the above   When did the person you live with first start having flu symptoms? Select one.    More than 48 hours ago   Not selected:    Less than 48 hours ago   ----------   Medical history   Medical history data does not currently exist for this patient.    "

## 2021-10-26 ENCOUNTER — PATIENT MESSAGE (OUTPATIENT)
Dept: INTERNAL MEDICINE | Facility: CLINIC | Age: 36
End: 2021-10-26

## 2021-10-26 NOTE — TELEPHONE ENCOUNTER
From: Shanae Lockwood  To: Tiffany Schaefer MD  Sent: 10/26/2021 10:11 AM EDT  Subject: Update    Hello, I wanted to reach out about my symptoms, as they have changed. Yesterday I lost my voice, and today sounds a lot worse. I have been coughing more and having more congestion. My energy levels are fine, and I actually don't feel bad, I just sound like I'm really sick. I have been taking some Dayquil flu and drinking tea. Is it possible that I am contagious? I am currently working from home but I may have to go in for a meeting later and wonder if that's a bad idea. I of course would wear a mask, and the meeting is in an outdoor environment. I have no fever. Is the loss of voice a common symptom of the flu or could it be something else? My throat is not really sore, but I can feel its irritated or inflamed. Tea helps.    Thank you,   Shanae

## 2021-11-21 DIAGNOSIS — Z79.4 TYPE 2 DIABETES MELLITUS WITHOUT COMPLICATION, WITH LONG-TERM CURRENT USE OF INSULIN (HCC): ICD-10-CM

## 2021-11-21 DIAGNOSIS — E11.9 TYPE 2 DIABETES MELLITUS WITHOUT COMPLICATION, WITH LONG-TERM CURRENT USE OF INSULIN (HCC): ICD-10-CM

## 2021-11-22 ENCOUNTER — TELEPHONE (OUTPATIENT)
Dept: INTERNAL MEDICINE | Facility: CLINIC | Age: 36
End: 2021-11-22

## 2021-11-22 DIAGNOSIS — Z79.4 TYPE 2 DIABETES MELLITUS WITHOUT COMPLICATION, WITH LONG-TERM CURRENT USE OF INSULIN (HCC): ICD-10-CM

## 2021-11-22 DIAGNOSIS — E11.9 TYPE 2 DIABETES MELLITUS WITHOUT COMPLICATION, WITH LONG-TERM CURRENT USE OF INSULIN (HCC): ICD-10-CM

## 2021-11-22 RX ORDER — METFORMIN HYDROCHLORIDE 500 MG/1
500 TABLET, EXTENDED RELEASE ORAL
Qty: 180 TABLET | Refills: 1 | Status: SHIPPED | OUTPATIENT
Start: 2021-11-22 | End: 2021-11-23 | Stop reason: SDUPTHER

## 2021-11-22 NOTE — TELEPHONE ENCOUNTER
Please verify if he is taking dose once or twice a day--also, he is overdue for f/u appt with fasting labs. Thanks.

## 2021-11-22 NOTE — TELEPHONE ENCOUNTER
Patient states when he takes the metformin its hit or miss he will take two tablets daily. He is traveling out of town a lot and he would not schedule a office visit while I had him on the phone. I advised it would be best to schedule appt in order to receive future refills, thanks.

## 2021-11-22 NOTE — TELEPHONE ENCOUNTER
Caller: [facility] [address] [facility] ([place]), [place] - [address] - [phone]  - [phone] FX    Relationship: Pharmacy    Best call back number: [phone]    What is the best time to reach you: ANYTIME    Who are you requesting to speak with (clinical staff, provider,  specific staff member): CLINICAL STAFF    Do you know the name of the person who called: [place]    What was the call regarding: PHARMACY IS CALLING TO VERIFY THE CORRECT DOSAGE ON THE PATIENT'S   metFORMIN ER (GLUCOPHAGE-XR) 500 MG 24 hr tablet. IN THE PAST PATIENT HAS TAKEN THIS MEDICATION TWICE A DAY AND THE NEW PRESCRIPTION THEY RECEIVED TODAY 11/22/21 STATES PATIENT IS TO TAKE MEDICATION ONCE A DAY.     PLEASE CALL PHARMACY TO ADVISE ON CORRECT DOSAGE.

## 2021-11-23 RX ORDER — METFORMIN HYDROCHLORIDE 500 MG/1
500 TABLET, EXTENDED RELEASE ORAL
Qty: 60 TABLET | Refills: 0 | Status: SHIPPED | OUTPATIENT
Start: 2021-11-23 | End: 2021-12-14 | Stop reason: SDUPTHER

## 2021-12-05 DIAGNOSIS — R79.89 LOW TESTOSTERONE: ICD-10-CM

## 2021-12-08 RX ORDER — TESTOSTERONE CYPIONATE 100 MG/ML
200 INJECTION, SOLUTION INTRAMUSCULAR
Qty: 10 ML | Refills: 0 | Status: SHIPPED | OUTPATIENT
Start: 2021-12-08 | End: 2022-03-09 | Stop reason: SDUPTHER

## 2021-12-14 ENCOUNTER — IMMUNIZATION (OUTPATIENT)
Dept: VACCINE CLINIC | Facility: HOSPITAL | Age: 36
End: 2021-12-14

## 2021-12-14 ENCOUNTER — OFFICE VISIT (OUTPATIENT)
Dept: INTERNAL MEDICINE | Facility: CLINIC | Age: 36
End: 2021-12-14

## 2021-12-14 VITALS
OXYGEN SATURATION: 98 % | HEIGHT: 71 IN | DIASTOLIC BLOOD PRESSURE: 88 MMHG | SYSTOLIC BLOOD PRESSURE: 144 MMHG | BODY MASS INDEX: 44.1 KG/M2 | HEART RATE: 105 BPM | WEIGHT: 315 LBS | TEMPERATURE: 97.3 F

## 2021-12-14 DIAGNOSIS — I10 PRIMARY HYPERTENSION: ICD-10-CM

## 2021-12-14 DIAGNOSIS — R79.89 LOW TESTOSTERONE: Chronic | ICD-10-CM

## 2021-12-14 DIAGNOSIS — E11.65 UNCONTROLLED TYPE 2 DIABETES MELLITUS WITH HYPERGLYCEMIA (HCC): Primary | Chronic | ICD-10-CM

## 2021-12-14 DIAGNOSIS — F41.8 PERFORMANCE ANXIETY: ICD-10-CM

## 2021-12-14 PROCEDURE — 99214 OFFICE O/P EST MOD 30 MIN: CPT | Performed by: NURSE PRACTITIONER

## 2021-12-14 PROCEDURE — 91300 HC SARSCOV02 VAC 30MCG/0.3ML IM: CPT | Performed by: INTERNAL MEDICINE

## 2021-12-14 PROCEDURE — 0004A HC ADM SARSCOV2 30MCG/0.3ML BOOSTER: CPT | Performed by: INTERNAL MEDICINE

## 2021-12-14 RX ORDER — PROPRANOLOL HYDROCHLORIDE 10 MG/1
TABLET ORAL
Qty: 30 TABLET | Refills: 1 | Status: SHIPPED | OUTPATIENT
Start: 2021-12-14 | End: 2022-04-04

## 2021-12-14 RX ORDER — METFORMIN HYDROCHLORIDE 500 MG/1
1500 TABLET, EXTENDED RELEASE ORAL
Qty: 90 TABLET | Refills: 2 | Status: SHIPPED | OUTPATIENT
Start: 2021-12-14

## 2021-12-14 RX ORDER — LOSARTAN POTASSIUM 100 MG/1
100 TABLET ORAL DAILY
Qty: 30 TABLET | Refills: 1 | Status: SHIPPED | OUTPATIENT
Start: 2021-12-14 | End: 2022-04-04

## 2021-12-14 NOTE — PROGRESS NOTES
Chief Complaint  Anxiety, Diabetes, and Low testosterone    Subjective          Shanae Lockwood presents to [facility] for f/u regarding DM2, HTN and low testosterone.    He has tolerated 1 Metformin daily which was added at his last visit.  He states he became diabetic several years ago with a glucose reading >700.  He was immediately started on insulin.  He has not been a candidate for long lasting insulins due to the cost.  He does admit to not watching his diet and not taking his medications consistently.  He was also recently started on testosterone which he is administering at home.  He is doing well with this medication and does note improved energy level.    Diabetes  He has type 2 diabetes mellitus. No MedicAlert identification noted. The initial diagnosis of diabetes was made 10 years ago. Hypoglycemia symptoms include headaches, mood changes, nervousness/anxiousness, sweats and tremors. Pertinent negatives for hypoglycemia include no confusion, dizziness, hunger, pallor, seizures, sleepiness or speech difficulty. Associated symptoms include blurred vision, fatigue and visual change. Pertinent negatives for diabetes include no chest pain, no foot paresthesias, no foot ulcerations, no polydipsia, no polyphagia, no polyuria, no weakness and no weight loss. Pertinent negatives for hypoglycemia complications include no blackouts, no hospitalization, no nocturnal hypoglycemia, no required assistance and no required glucagon injection. Symptoms are stable. Diabetic complications include impotence and retinopathy. Pertinent negatives for diabetic complications include no CVA, heart disease, nephropathy, peripheral neuropathy or PVD. There are no known risk factors for coronary artery disease. Current diabetic treatment includes insulin injections and oral agent (monotherapy). He is compliant with treatment most of the time. He is currently taking insulin at bedtime. Insulin injections are  "given by patient. Rotation sites for injection include the abdominal wall. His weight is fluctuating minimally. He is following a vegetarian diet. When asked about meal planning, he reported none. He has not had a previous visit with a dietitian. He rarely participates in exercise. He monitors blood glucose at home 1-2 x per week. He monitors urine at home <1 x per month. Blood glucose monitoring compliance is poor. There is no change in his home blood glucose trend. His breakfast blood glucose is taken between 5-6 am. His breakfast blood glucose range is generally 70-90 mg/dl. His highest blood glucose is >200 mg/dl. His overall blood glucose range is >200 mg/dl. He does not see a podiatrist.Eye exam is not current.       Objective   Vital Signs:   /88 (BP Location: Left arm, Patient Position: Sitting, Cuff Size: Adult)   Pulse 105   Temp 97.3 °F (36.3 °C) (Temporal)   Ht 180.3 cm (71\")   Wt (!) 154 kg (340 lb)   SpO2 98%   BMI 47.42 kg/m²     Physical Exam  Constitutional:       Appearance: He is well-developed. He is not ill-appearing.   HENT:      Head: Normocephalic.      Right Ear: Hearing, tympanic membrane and external ear normal.      Left Ear: Hearing, tympanic membrane and external ear normal.      Nose: Nose normal. No nasal deformity, mucosal edema or rhinorrhea.      Right Sinus: No maxillary sinus tenderness or frontal sinus tenderness.      Left Sinus: No maxillary sinus tenderness or frontal sinus tenderness.      Mouth/Throat:      Dentition: Normal dentition.   Eyes:      General: Lids are normal.         Right eye: No discharge.         Left eye: No discharge.      Conjunctiva/sclera: Conjunctivae normal.      Right eye: No exudate.     Left eye: No exudate.  Neck:      Thyroid: No thyroid mass or thyromegaly.      Vascular: No carotid bruit.      Trachea: Trachea normal.   Cardiovascular:      Rate and Rhythm: Regular rhythm.      Pulses: Normal pulses.      Heart sounds: Normal " heart sounds. No murmur heard.      Pulmonary:      Effort: No respiratory distress.      Breath sounds: Normal breath sounds. No decreased breath sounds, wheezing, rhonchi or rales.   Abdominal:      General: Bowel sounds are normal.      Palpations: Abdomen is soft.      Tenderness: There is no abdominal tenderness.   Musculoskeletal:      Cervical back: Normal range of motion. No edema.   Lymphadenopathy:      Head:      Right side of head: No submental, submandibular, tonsillar, preauricular, posterior auricular or occipital adenopathy.      Left side of head: No submental, submandibular, tonsillar, preauricular, posterior auricular or occipital adenopathy.   Skin:     General: Skin is warm and dry.      Nails: There is no clubbing.   Neurological:      Mental Status: He is alert.   Psychiatric:         Behavior: Behavior is cooperative.        Result Review :   The following data was reviewed by: ASHWIN Ward on 12/14/2021:  Common labs    Common Labsle 12/31/20 12/31/20 2/16/21 2/16/21 3/30/21 3/30/21 3/30/21    1207 1207 1321 1321 1513 1513 1513   Glucose  105 (A)  157 (A)  171 (A)    BUN  6  9  9    Creatinine  0.97  1.00  0.96    eGFR Non  Am  89    90    eGFR  Am  108  104  109    Sodium  138  134 (A)  137    Potassium  4.3  4.2  4.1    Chloride  101  97 (A)  96 (A)    Calcium  8.8  9.3  9.7    Total Protein  6.3    7.7    Albumin  3.90  4.20  4.60    Total Bilirubin  0.5  0.7  0.8    Alkaline Phosphatase  60  66  79    AST (SGOT)  60 (A)  42 (A)  42 (A)    ALT (SGPT)  91 (A)  59 (A)  68 (A)    WBC   5.16       Hemoglobin   15.3       Hematocrit   44.1       Platelets   211       Total Cholesterol       149   Triglycerides       256 (A)   HDL Cholesterol       34 (A)   LDL Cholesterol        73   Hemoglobin A1C 9.60 (A)    9.30 (A)     (A) Abnormal value       Comments are available for some flowsheets but are not being displayed.                     Assessment and Plan    Diagnoses  and all orders for this visit:    1. Uncontrolled type 2 diabetes mellitus with hyperglycemia (HCC) (Primary)  Assessment & Plan:  His diabetes has been poorly controlled.  He is agreeable to increasing Metformin from 1 tablet to 3 tablets daily.  He will monitor for GI side effects.  He will continue Novolin R and Novolin S.  However, I would like for him to see endocrinology for better management of his diabetes.  He has been unable to take long-lasting insulin due to the cost.   Discussed low-carb, low sugar diet as well.    Orders:  -     metFORMIN ER (GLUCOPHAGE-XR) 500 MG 24 hr tablet; Take 3 tablets by mouth Daily With Breakfast.  Dispense: 90 tablet; Refill: 2  -     Ambulatory Referral to Endocrinology  -     Hemoglobin A1c  -     Comprehensive Metabolic Panel    2. Low testosterone  Assessment & Plan:  He started testosterone injections August 2020 but unfortunately has been inconsistent with follow-up.  We will check labs today but also referred to endocrinology for further evaluation of low testosterone.    Orders:  -     Ambulatory Referral to Endocrinology  -     Testosterone, Free, Total    3. Primary hypertension  Assessment & Plan:  He did not take lisinopril due to concerns over a possible reaction.  He is started on losartan both for renal protection and better blood pressure control.    Orders:  -     losartan (COZAAR) 100 MG tablet; Take 1 tablet by mouth Daily.  Dispense: 30 tablet; Refill: 1    4. 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    Orders:  -     propranolol (INDERAL) 10 MG tablet; Prior to performance  Dispense: 30 tablet; Refill: 1      Follow Up   Return in about 3 months (around 3/14/2022).  Patient was given instructions and counseling regarding his condition or for health maintenance advice. Please see specific information pulled into the AVS if appropriate.       Answers for HPI/ROS  submitted by the patient on 12/14/2021  What is the primary reason for your visit?: Diabetes

## 2021-12-15 PROBLEM — F41.8 PERFORMANCE ANXIETY: Status: ACTIVE | Noted: 2021-12-15

## 2021-12-15 PROBLEM — F41.8 PERFORMANCE ANXIETY: Chronic | Status: ACTIVE | Noted: 2021-12-15

## 2021-12-15 PROBLEM — I10 PRIMARY HYPERTENSION: Chronic | Status: ACTIVE | Noted: 2021-12-15

## 2021-12-15 PROBLEM — I10 PRIMARY HYPERTENSION: Status: ACTIVE | Noted: 2021-12-15

## 2021-12-15 LAB
ALBUMIN SERPL-MCNC: 4.3 G/DL (ref 4–5)
ALBUMIN/GLOB SERPL: 1.5 {RATIO} (ref 1.2–2.2)
ALP SERPL-CCNC: 88 IU/L (ref 44–121)
ALT SERPL-CCNC: 59 IU/L (ref 0–44)
AST SERPL-CCNC: 30 IU/L (ref 0–40)
BILIRUB SERPL-MCNC: 0.4 MG/DL (ref 0–1.2)
BUN SERPL-MCNC: 12 MG/DL (ref 6–20)
BUN/CREAT SERPL: 11 (ref 9–20)
CALCIUM SERPL-MCNC: 9.6 MG/DL (ref 8.7–10.2)
CHLORIDE SERPL-SCNC: 98 MMOL/L (ref 96–106)
CO2 SERPL-SCNC: 24 MMOL/L (ref 20–29)
CREAT SERPL-MCNC: 1.06 MG/DL (ref 0.76–1.27)
GLOBULIN SER CALC-MCNC: 2.9 G/DL (ref 1.5–4.5)
GLUCOSE SERPL-MCNC: 182 MG/DL (ref 65–99)
HBA1C MFR BLD: 11.2 % (ref 4.8–5.6)
POTASSIUM SERPL-SCNC: 4.3 MMOL/L (ref 3.5–5.2)
PROT SERPL-MCNC: 7.2 G/DL (ref 6–8.5)
SODIUM SERPL-SCNC: 136 MMOL/L (ref 134–144)
TESTOST FREE SERPL-MCNC: 25.8 PG/ML (ref 8.7–25.1)
TESTOST SERPL-MCNC: 492 NG/DL (ref 264–916)

## 2021-12-15 NOTE — ASSESSMENT & PLAN NOTE
He started testosterone injections August 2020 but unfortunately has been inconsistent with follow-up.  We will check labs today but also referred to endocrinology for further evaluation of low testosterone.

## 2021-12-15 NOTE — ASSESSMENT & PLAN NOTE
His diabetes has been poorly controlled.  He is agreeable to increasing Metformin from 1 tablet to 3 tablets daily.  He will monitor for GI side effects.  He will continue Novolin R and Novolin S.  However, I would like for him to see endocrinology for better management of his diabetes.  He has been unable to take long-lasting insulin due to the cost.   Discussed low-carb, low sugar diet as well.

## 2021-12-15 NOTE — ASSESSMENT & PLAN NOTE
He did not take lisinopril due to concerns over a possible reaction.  He is started on losartan both for renal protection and better blood pressure control.

## 2022-02-23 ENCOUNTER — HOSPITAL ENCOUNTER (EMERGENCY)
Facility: HOSPITAL | Age: 37
Discharge: HOME | End: 2022-02-24
Attending: EMERGENCY MEDICINE | Admitting: EMERGENCY MEDICINE

## 2022-02-23 DIAGNOSIS — A08.11 GASTROENTERITIS DUE TO NOROVIRUS: Primary | ICD-10-CM

## 2022-02-23 DIAGNOSIS — E86.0 DEHYDRATION: ICD-10-CM

## 2022-02-23 LAB
ADV 40+41 DNA STL QL NAA+NON-PROBE: NOT DETECTED
ALBUMIN SERPL-MCNC: 3.9 G/DL (ref 3.5–5.2)
ALBUMIN/GLOB SERPL: 1.3 G/DL
ALP SERPL-CCNC: 57 U/L (ref 39–117)
ALT SERPL W P-5'-P-CCNC: 99 U/L (ref 1–41)
ANION GAP SERPL CALCULATED.3IONS-SCNC: 12 MMOL/L (ref 5–15)
AST SERPL-CCNC: 70 U/L (ref 1–40)
ASTRO TYP 1-8 RNA STL QL NAA+NON-PROBE: NOT DETECTED
BASOPHILS # BLD AUTO: 0.01 10*3/MM3 (ref 0–0.2)
BASOPHILS NFR BLD AUTO: 0.2 % (ref 0–1.5)
BILIRUB SERPL-MCNC: 1 MG/DL (ref 0–1.2)
BUN SERPL-MCNC: 15 MG/DL (ref 6–20)
BUN/CREAT SERPL: 11.1 (ref 7–25)
C CAYETANENSIS DNA STL QL NAA+NON-PROBE: NOT DETECTED
C COLI+JEJ+UPSA DNA STL QL NAA+NON-PROBE: NOT DETECTED
CALCIUM SPEC-SCNC: 8.4 MG/DL (ref 8.6–10.5)
CHLORIDE SERPL-SCNC: 97 MMOL/L (ref 98–107)
CO2 SERPL-SCNC: 23 MMOL/L (ref 22–29)
CREAT SERPL-MCNC: 1.35 MG/DL (ref 0.76–1.27)
CRYPTOSP DNA STL QL NAA+NON-PROBE: NOT DETECTED
D-LACTATE SERPL-SCNC: 1.4 MMOL/L (ref 0.5–2)
DEPRECATED RDW RBC AUTO: 48 FL (ref 37–54)
E HISTOLYT DNA STL QL NAA+NON-PROBE: NOT DETECTED
EAEC PAA PLAS AGGR+AATA ST NAA+NON-PRB: NOT DETECTED
EC STX1+STX2 GENES STL QL NAA+NON-PROBE: NOT DETECTED
EOSINOPHIL # BLD AUTO: 0.04 10*3/MM3 (ref 0–0.4)
EOSINOPHIL NFR BLD AUTO: 0.7 % (ref 0.3–6.2)
EPEC EAE GENE STL QL NAA+NON-PROBE: NOT DETECTED
ERYTHROCYTE [DISTWIDTH] IN BLOOD BY AUTOMATED COUNT: 15.2 % (ref 12.3–15.4)
ETEC LTA+ST1A+ST1B TOX ST NAA+NON-PROBE: NOT DETECTED
G LAMBLIA DNA STL QL NAA+NON-PROBE: NOT DETECTED
GFR SERPL CREATININE-BSD FRML MDRD: 73 ML/MIN/1.73
GLOBULIN UR ELPH-MCNC: 3.1 GM/DL
GLUCOSE SERPL-MCNC: 190 MG/DL (ref 65–99)
HCT VFR BLD AUTO: 47.9 % (ref 37.5–51)
HGB BLD-MCNC: 15.9 G/DL (ref 13–17.7)
HOLD SPECIMEN: NORMAL
HOLD SPECIMEN: NORMAL
IMM GRANULOCYTES # BLD AUTO: 0.03 10*3/MM3 (ref 0–0.05)
IMM GRANULOCYTES NFR BLD AUTO: 0.5 % (ref 0–0.5)
LYMPHOCYTES # BLD AUTO: 0.56 10*3/MM3 (ref 0.7–3.1)
LYMPHOCYTES NFR BLD AUTO: 9.8 % (ref 19.6–45.3)
MCH RBC QN AUTO: 28.7 PG (ref 26.6–33)
MCHC RBC AUTO-ENTMCNC: 33.2 G/DL (ref 31.5–35.7)
MCV RBC AUTO: 86.5 FL (ref 79–97)
MONOCYTES # BLD AUTO: 0.45 10*3/MM3 (ref 0.1–0.9)
MONOCYTES NFR BLD AUTO: 7.9 % (ref 5–12)
NEUTROPHILS NFR BLD AUTO: 4.6 10*3/MM3 (ref 1.7–7)
NEUTROPHILS NFR BLD AUTO: 80.9 % (ref 42.7–76)
NOROVIRUS GI+II RNA STL QL NAA+NON-PROBE: DETECTED
NRBC BLD AUTO-RTO: 0 /100 WBC (ref 0–0.2)
P SHIGELLOIDES DNA STL QL NAA+NON-PROBE: NOT DETECTED
PLATELET # BLD AUTO: 233 10*3/MM3 (ref 140–450)
PMV BLD AUTO: 10.1 FL (ref 6–12)
POTASSIUM SERPL-SCNC: 4.5 MMOL/L (ref 3.5–5.2)
PROT SERPL-MCNC: 7 G/DL (ref 6–8.5)
RBC # BLD AUTO: 5.54 10*6/MM3 (ref 4.14–5.8)
RVA RNA STL QL NAA+NON-PROBE: NOT DETECTED
S ENT+BONG DNA STL QL NAA+NON-PROBE: NOT DETECTED
SAPO I+II+IV+V RNA STL QL NAA+NON-PROBE: NOT DETECTED
SHIGELLA SP+EIEC IPAH ST NAA+NON-PROBE: NOT DETECTED
SODIUM SERPL-SCNC: 132 MMOL/L (ref 136–145)
V CHOL+PARA+VUL DNA STL QL NAA+NON-PROBE: NOT DETECTED
V CHOLERAE DNA STL QL NAA+NON-PROBE: NOT DETECTED
WBC NRBC COR # BLD: 5.69 10*3/MM3 (ref 3.4–10.8)
WHOLE BLOOD HOLD SPECIMEN: NORMAL
WHOLE BLOOD HOLD SPECIMEN: NORMAL
Y ENTEROCOL DNA STL QL NAA+NON-PROBE: NOT DETECTED

## 2022-02-23 PROCEDURE — 85025 COMPLETE CBC W/AUTO DIFF WBC: CPT | Performed by: EMERGENCY MEDICINE

## 2022-02-23 PROCEDURE — 83605 ASSAY OF LACTIC ACID: CPT | Performed by: EMERGENCY MEDICINE

## 2022-02-23 PROCEDURE — 96375 TX/PRO/DX INJ NEW DRUG ADDON: CPT

## 2022-02-23 PROCEDURE — 0097U HC BIOFIRE FILMARRAY GI PANEL: CPT | Performed by: EMERGENCY MEDICINE

## 2022-02-23 PROCEDURE — 96374 THER/PROPH/DIAG INJ IV PUSH: CPT

## 2022-02-23 PROCEDURE — 87040 BLOOD CULTURE FOR BACTERIA: CPT | Performed by: EMERGENCY MEDICINE

## 2022-02-23 PROCEDURE — 25010000002 ONDANSETRON PER 1 MG: Performed by: EMERGENCY MEDICINE

## 2022-02-23 PROCEDURE — 99284 EMERGENCY DEPT VISIT MOD MDM: CPT

## 2022-02-23 PROCEDURE — 80053 COMPREHEN METABOLIC PANEL: CPT | Performed by: EMERGENCY MEDICINE

## 2022-02-23 RX ORDER — ONDANSETRON 2 MG/ML
4 INJECTION INTRAMUSCULAR; INTRAVENOUS ONCE
Status: COMPLETED | OUTPATIENT
Start: 2022-02-23 | End: 2022-02-23

## 2022-02-23 RX ORDER — ACETAMINOPHEN 500 MG
1000 TABLET ORAL ONCE
Status: COMPLETED | OUTPATIENT
Start: 2022-02-23 | End: 2022-02-23

## 2022-02-23 RX ORDER — SODIUM CHLORIDE 0.9 % (FLUSH) 0.9 %
10 SYRINGE (ML) INJECTION AS NEEDED
Status: DISCONTINUED | OUTPATIENT
Start: 2022-02-23 | End: 2022-02-24 | Stop reason: HOSPADM

## 2022-02-23 RX ORDER — FAMOTIDINE 10 MG/ML
20 INJECTION, SOLUTION INTRAVENOUS ONCE
Status: COMPLETED | OUTPATIENT
Start: 2022-02-23 | End: 2022-02-23

## 2022-02-23 RX ADMIN — SODIUM CHLORIDE, POTASSIUM CHLORIDE, SODIUM LACTATE AND CALCIUM CHLORIDE 1000 ML: 600; 310; 30; 20 INJECTION, SOLUTION INTRAVENOUS at 22:03

## 2022-02-23 RX ADMIN — SODIUM CHLORIDE, POTASSIUM CHLORIDE, SODIUM LACTATE AND CALCIUM CHLORIDE 1000 ML: 600; 310; 30; 20 INJECTION, SOLUTION INTRAVENOUS at 22:11

## 2022-02-23 RX ADMIN — FAMOTIDINE 20 MG: 10 INJECTION INTRAVENOUS at 22:11

## 2022-02-23 RX ADMIN — ACETAMINOPHEN 1000 MG: 500 TABLET ORAL at 22:11

## 2022-02-23 RX ADMIN — ONDANSETRON 4 MG: 2 INJECTION INTRAMUSCULAR; INTRAVENOUS at 22:11

## 2022-02-24 VITALS
HEIGHT: 72 IN | BODY MASS INDEX: 42.66 KG/M2 | WEIGHT: 315 LBS | TEMPERATURE: 102.4 F | HEART RATE: 112 BPM | RESPIRATION RATE: 18 BRPM | DIASTOLIC BLOOD PRESSURE: 65 MMHG | OXYGEN SATURATION: 96 % | SYSTOLIC BLOOD PRESSURE: 110 MMHG

## 2022-02-24 RX ORDER — ONDANSETRON 4 MG/1
4 TABLET, ORALLY DISINTEGRATING ORAL EVERY 6 HOURS PRN
Qty: 10 TABLET | Refills: 0 | Status: SHIPPED | OUTPATIENT
Start: 2022-02-24 | End: 2022-03-25

## 2022-02-24 NOTE — ED NOTES
Pt ambulatory to triage from home with c/o abdominal pain, nausea and diarrhea. States ate some raw oysters last night.  Today woke up with diarrhea, nausea and vomiting.  Pt wearing mask in triage. Triage personnel wore appropriate PPE       Greer Duncan, RN  02/23/22 0388

## 2022-02-24 NOTE — ED PROVIDER NOTES
EMERGENCY DEPARTMENT ENCOUNTER  I wore full protective equipment throughout this patient encounter including a N95 mask, eye shield, gown and gloves. Hand hygiene was performed before donning protective equipment and after removal when leaving the room.    Room Number:  22/22  Date of encounter:  2/24/2022  PCP: Brie Sifuentes APRN    HPI:  Context: Shanae Lockwood is a 36 y.o. male who presents to the ED c/o chief complaint of possible food poisoning.  Patient states he ate raw oysters yesterday evening.  He states that they tasted funny and had a plastic taste to them.  He woke up early this morning with nausea, vomiting diarrhea.  He has had multiple rounds of vomiting and diarrhea throughout the day along with diffuse abdominal intermittent cramping.  He denies urinary symptoms but has been feeling weaker throughout the day and lightheaded.  He did a home Covid test that came back negative.  Patient states he has been immunized against SARS-CoV-2.  He continue to get weaker so his family brought him here for further evaluation and treatment.    MEDICAL HISTORY REVIEW  Reviewed in EPIC    PAST MEDICAL HISTORY  Active Ambulatory Problems     Diagnosis Date Noted   • Erectile dysfunction 08/30/2020   • Uncontrolled type 2 diabetes mellitus with hyperglycemia ([facility]) 09/26/2020   • Low testosterone 09/26/2020   • Gastroesophageal reflux disease 12/29/2020   • Class 3 severe obesity due to excess calories with serious comorbidity and body mass index (BMI) of 45.0 to 49.9 in adult ([facility]) 03/31/2021   • Snoring 03/31/2021   • Primary hypertension 12/15/2021   • Performance anxiety 12/15/2021     Resolved Ambulatory Problems     Diagnosis Date Noted   • No Resolved Ambulatory Problems     Past Medical History:   Diagnosis Date   • Obesity        PAST SURGICAL HISTORY  No past surgical history on file.    FAMILY HISTORY  Family History   Problem Relation Age of Onset   • Diabetes type II Father    • Diabetes Father    •  Diabetes type II Brother         now controlled with diet   • Diabetes Brother        SOCIAL HISTORY  Social History     Socioeconomic History   • Marital status: Single   Tobacco Use   • Smoking status: Never Smoker   • Smokeless tobacco: Never Used   Substance and Sexual Activity   • Alcohol use: Yes     Alcohol/week: 0.0 standard drinks     Comment: occasional   • Drug use: Never   • Sexual activity: Yes     Partners: Female     Birth control/protection: None       ALLERGIES  Patient has no known allergies.    The patient's allergies have been reviewed    REVIEW OF SYSTEMS  All systems reviewed and negative except for those discussed in HPI.     PHYSICAL EXAM  I have reviewed the triage vital signs and nursing notes.  ED Triage Vitals   Temp Heart Rate Resp BP SpO2   02/23/22 2136 02/23/22 2136 02/23/22 2136 02/23/22 2138 02/23/22 2136   (!) 102.4 °F (39.1 °C) (!) 147 18 132/82 98 %      Temp src Heart Rate Source Patient Position BP Location FiO2 (%)   02/23/22 2136 02/23/22 2136 02/23/22 2138 02/23/22 2138 --   Tympanic Monitor Standing Left arm        General: Mild distress.  HENT: NCAT, PERRL, Nares patent.  Eyes: no scleral icterus.  Neck: trachea midline, no ROM limitations.  CV: Tachycardic without murmurs.  Respiratory: normal effort, CTAB.  Abdomen: soft, nondistended, NTTP, no rebound tenderness, no guarding or rigidity.  Musculoskeletal: no deformity.  Neuro: alert, moves all extremities, follows commands.  Skin: warm, dry.    LAB RESULTS  Recent Results (from the past 24 hour(s))   Gastrointestinal Panel, PCR - Stool, Per Rectum    Collection Time: 02/23/22  9:54 PM    Specimen: Per Rectum; Stool   Result Value Ref Range    Campylobacter Not Detected Not Detected    Plesiomonas shigelloides Not Detected Not Detected    Salmonella Not Detected Not Detected    Vibrio Not Detected Not Detected    Vibrio cholerae Not Detected Not Detected    Yersinia enterocolitica Not Detected Not Detected     Enteroaggregative E. coli (EAEC) Not Detected Not Detected    Enteropathogenic E. coli (EPEC) Not Detected Not Detected    Enterotoxigenic E. coli (ETEC) lt/st Not Detected Not Detected    Shiga-like toxin-producing E. coli (STEC) stx1/stx2 Not Detected Not Detected    Shigella/Enteroinvasive E. coli (EIEC) Not Detected Not Detected    Cryptosporidium Not Detected Not Detected    Cyclospora cayetanensis Not Detected Not Detected    Entamoeba histolytica Not Detected Not Detected    Giardia lamblia Not Detected Not Detected    Adenovirus F40/41 Not Detected Not Detected    Astrovirus Not Detected Not Detected    Norovirus GI/GII Detected (A) Not Detected    Rotavirus A Not Detected Not Detected    Sapovirus (I, II, IV or V) Not Detected Not Detected   Comprehensive Metabolic Panel    Collection Time: 02/23/22 10:03 PM    Specimen: Blood   Result Value Ref Range    Glucose 190 (H) 65 - 99 mg/dL    BUN 15 6 - 20 mg/dL    Creatinine 1.35 (H) 0.76 - 1.27 mg/dL    Sodium 132 (L) 136 - 145 mmol/L    Potassium 4.5 3.5 - 5.2 mmol/L    Chloride 97 (L) 98 - 107 mmol/L    CO2 23.0 22.0 - 29.0 mmol/L    Calcium 8.4 (L) 8.6 - 10.5 mg/dL    Total Protein 7.0 6.0 - 8.5 g/dL    Albumin 3.90 3.50 - 5.20 g/dL    ALT (SGPT) 99 (H) 1 - 41 U/L    AST (SGOT) 70 (H) 1 - 40 U/L    Alkaline Phosphatase 57 39 - 117 U/L    Total Bilirubin 1.0 0.0 - 1.2 mg/dL    eGFR  African Amer 73 >60 mL/min/1.73    Globulin 3.1 gm/dL    A/G Ratio 1.3 g/dL    BUN/Creatinine Ratio 11.1 7.0 - 25.0    Anion Gap 12.0 5.0 - 15.0 mmol/L   Lactic Acid, Plasma    Collection Time: 02/23/22 10:03 PM    Specimen: Blood   Result Value Ref Range    Lactate 1.4 0.5 - 2.0 mmol/L   CBC Auto Differential    Collection Time: 02/23/22 10:03 PM    Specimen: Blood   Result Value Ref Range    WBC 5.69 3.40 - 10.80 10*3/mm3    RBC 5.54 4.14 - 5.80 10*6/mm3    Hemoglobin 15.9 13.0 - 17.7 g/dL    Hematocrit 47.9 37.5 - 51.0 %    MCV 86.5 79.0 - 97.0 fL    MCH 28.7 26.6 - 33.0 pg     MCHC 33.2 31.5 - 35.7 g/dL    RDW 15.2 12.3 - 15.4 %    RDW-SD 48.0 37.0 - 54.0 fl    MPV 10.1 6.0 - 12.0 fL    Platelets 233 140 - 450 10*3/mm3    Neutrophil % 80.9 (H) 42.7 - 76.0 %    Lymphocyte % 9.8 (L) 19.6 - 45.3 %    Monocyte % 7.9 5.0 - 12.0 %    Eosinophil % 0.7 0.3 - 6.2 %    Basophil % 0.2 0.0 - 1.5 %    Immature Grans % 0.5 0.0 - 0.5 %    Neutrophils, Absolute 4.60 1.70 - 7.00 10*3/mm3    Lymphocytes, Absolute 0.56 (L) 0.70 - 3.10 10*3/mm3    Monocytes, Absolute 0.45 0.10 - 0.90 10*3/mm3    Eosinophils, Absolute 0.04 0.00 - 0.40 10*3/mm3    Basophils, Absolute 0.01 0.00 - 0.20 10*3/mm3    Immature Grans, Absolute 0.03 0.00 - 0.05 10*3/mm3    nRBC 0.0 0.0 - 0.2 /100 WBC   Green Top (Gel)    Collection Time: 02/23/22 10:03 PM   Result Value Ref Range    Extra Tube Hold for add-ons.    Lavender Top    Collection Time: 02/23/22 10:03 PM   Result Value Ref Range    Extra Tube hold for add-on    Gold Top - SST    Collection Time: 02/23/22 10:03 PM   Result Value Ref Range    Extra Tube Hold for add-ons.    Light Blue Top    Collection Time: 02/23/22 10:03 PM   Result Value Ref Range    Extra Tube hold for add-on        I ordered the above labs and reviewed the results.    RADIOLOGY  No Radiology Exams Resulted Within Past 24 Hours    I ordered the above noted radiological studies. I reviewed the images and results. I agree with the radiologist interpretation.    PROCEDURES  Procedures    MEDICATIONS GIVEN IN ER  Medications   sodium chloride 0.9 % flush 10 mL (has no administration in time range)   lactated ringers bolus 1,000 mL (0 mL Intravenous Stopped 2/24/22 0000)   lactated ringers bolus 1,000 mL (0 mL Intravenous Stopped 2/24/22 0000)   ondansetron (ZOFRAN) injection 4 mg (4 mg Intravenous Given 2/23/22 2211)   famotidine (PEPCID) injection 20 mg (20 mg Intravenous Given 2/23/22 2211)   acetaminophen (TYLENOL) tablet 1,000 mg (1,000 mg Oral Given 2/23/22 2211)       PROGRESS, DATA ANALYSIS, CONSULTS,  AND MEDICAL DECISION MAKING  A complete history and physical exam have been performed.  All available laboratory and imaging results have been reviewed by myself prior to disposition.    MDM  After the initial H&P, I discussed pertinent information from history and physical exam with patient/family.  Discussed differential diagnosis.  Discussed plan for ED evaluation/workup/treatment.  All questions answered.  Patient/family is agreeable with plan.  ED Course as of 02/24/22 0159   Wed Feb 23, 2022   2205 Patient presents with nausea, vomiting and diarrhea after eating oysters that did not taste right.  I strongly suspect food poisoning from that oysters.  I do not think that patient has COVID-19.  We will give IV fluids, Pepcid and Zofran.  We will check a GI panel along with basic blood work.  I will also give Tylenol for his fever. [DE]   2252 Glucose(!): 190 [DE]   2252 Lactate: 1.4 [DE]   2252 WBC: 5.69 [DE]   2252 Hemoglobin: 15.9 [DE]   2252 Sodium(!): 132 [DE]   2351 Patient is feeling better after 2 L of IV fluids.  His current blood pressure is 107/69.  We will try an oral challenge.  Patient's GI panel is positive for norovirus. [DE]   2352 Norovirus GI/GII(!): Detected [DE]   Thu Feb 24, 2022   0029 Patient has been able to ambulate and is feeling better.  We will discharge patient to home. [DE]      ED Course User Index  [DE] Magnus Bernard MD       AS OF 01:59 EST VITALS:    BP - 110/65  HR - 112  TEMP - (!) 102.4 °F (39.1 °C) (Tympanic)  O2 SATS - 96%    DIAGNOSIS  Final diagnoses:   Gastroenteritis due to norovirus   Dehydration         DISPOSITION    DISCHARGE    Patient discharged in stable condition.    Reviewed implications of results, diagnosis, meds, responsibility to follow up, warning signs and symptoms of possible worsening, potential complications and reasons to return to ER.    Patient/Family voiced understanding of above instructions.    Discussed plan for discharge, as there is no  emergent indication for admission. Patient referred to primary care provider for BP management due to today's BP. Pt/family is agreeable and understands need for follow up and repeat testing.  Pt is aware that discharge does not mean that nothing is wrong but it indicates no emergency is present that requires admission and they must continue care with follow-up as given below or physician of their choice.     FOLLOW-UP  Brie Sifuentes, APRN  [address] [id number] [phone]    Schedule an appointment as soon as possible for a visit            Medication List      New Prescriptions    ondansetron ODT 4 MG disintegrating tablet  Commonly known as: ZOFRAN-ODT  Place 1 tablet on the tongue Every 6 (Six) Hours As Needed for Nausea.           Where to Get Your Medications      These medications were sent to [facility] [address] [place] [address] ([facility]), [place] - [address] [facility] [phone]  - [phone] FX [address] [facility] ([facility]) [place] [address]    Phone: [phone]   · ondansetron ODT 4 MG disintegrating tablet          Magnus Bernard MD  02/24/22 0159

## 2022-02-24 NOTE — DISCHARGE INSTRUCTIONS
Drink plenty of fluids and take it easy for the next several days.  You can use over-the-counter Pepcid twice a day.  You can use over-the-counter Imodium as needed for diarrhea.  Please return to the emergency department if symptoms worsen.

## 2022-02-28 LAB
BACTERIA SPEC AEROBE CULT: NORMAL
BACTERIA SPEC AEROBE CULT: NORMAL

## 2022-03-09 DIAGNOSIS — R79.89 LOW TESTOSTERONE: ICD-10-CM

## 2022-03-11 RX ORDER — TESTOSTERONE CYPIONATE 100 MG/ML
200 INJECTION, SOLUTION INTRAMUSCULAR
Qty: 4 ML | Refills: 0 | Status: SHIPPED | OUTPATIENT
Start: 2022-03-11 | End: 2022-07-10 | Stop reason: SDUPTHER

## 2022-03-25 ENCOUNTER — OFFICE VISIT (OUTPATIENT)
Dept: INTERNAL MEDICINE | Facility: CLINIC | Age: 37
End: 2022-03-25

## 2022-03-25 VITALS
TEMPERATURE: 97.1 F | SYSTOLIC BLOOD PRESSURE: 130 MMHG | OXYGEN SATURATION: 98 % | HEART RATE: 102 BPM | DIASTOLIC BLOOD PRESSURE: 80 MMHG | BODY MASS INDEX: 42.66 KG/M2 | WEIGHT: 315 LBS | HEIGHT: 72 IN

## 2022-03-25 DIAGNOSIS — I10 PRIMARY HYPERTENSION: Chronic | ICD-10-CM

## 2022-03-25 DIAGNOSIS — R79.89 LOW TESTOSTERONE: Chronic | ICD-10-CM

## 2022-03-25 DIAGNOSIS — Z00.00 PHYSICAL EXAM: Primary | ICD-10-CM

## 2022-03-25 DIAGNOSIS — E11.65 UNCONTROLLED TYPE 2 DIABETES MELLITUS WITH HYPERGLYCEMIA: Chronic | ICD-10-CM

## 2022-03-25 PROCEDURE — 99395 PREV VISIT EST AGE 18-39: CPT | Performed by: NURSE PRACTITIONER

## 2022-03-25 RX ORDER — INSULIN DETEMIR 100 [IU]/ML
30 INJECTION, SOLUTION SUBCUTANEOUS DAILY
Qty: 3 ML | Refills: 1 | Status: SHIPPED | OUTPATIENT
Start: 2022-03-25 | End: 2022-04-04

## 2022-03-27 NOTE — ASSESSMENT & PLAN NOTE
He is tolerating Metformin daily which was increased to 3 daily 12/2021. He is agreeable to restarting Levemir (pending insurance coverage). He is also interested in weight loss, will start Ozempic for better glucose control (will check insurance coverage).  He is given a sample of Ozempic with instructions on administration and dose titration.

## 2022-03-27 NOTE — PROGRESS NOTES
"Missy Lockwood is a 36 y.o. male who presents for a physical exam.    His last A1c was elevated at 11.2 but had not administered insulin consistently, referred to endocrinology. He has been managed on Humulin R 100 units bid due to cost and lack of coverage of other medications. He is also managed on Metformin daily.  He was seen in the ER 2/23 and diagnosed with norovirus, abdominal pain and loose stools resolved.     The following portions of the patient's history were reviewed and updated as appropriate: allergies, current medications, past social history and problem list.    Past Medical History:   Diagnosis Date   • Obesity          Current Outpatient Medications:   •  Insulin NPH Human, Isophane, (NOVOLIN N SC), Inject 100 Units under the skin into the appropriate area as directed Daily With Breakfast, Lunch & Dinner., Disp: , Rfl:   •  Insulin Regular Human (NOVOLIN R IJ), Inject 100 Units as directed 2 (two) times a day., Disp: , Rfl:   •  losartan (COZAAR) 100 MG tablet, Take 1 tablet by mouth Daily., Disp: 30 tablet, Rfl: 1  •  metFORMIN ER (GLUCOPHAGE-XR) 500 MG 24 hr tablet, Take 3 tablets by mouth Daily With Breakfast., Disp: 90 tablet, Rfl: 2  •  Needle, Disp, 21G X 1\" misc, 1 Units Every 14 (Fourteen) Days., Disp: 50 each, Rfl: 1  •  omeprazole (priLOSEC) 40 MG capsule, Take 1 capsule by mouth Daily., Disp: 90 capsule, Rfl: 2  •  propranolol (INDERAL) 10 MG tablet, Prior to performance, Disp: 30 tablet, Rfl: 1  •  testosterone cypionate (Depo-Testosterone) 100 MG/ML solution injection, Inject 2 mL into the appropriate muscle as directed by prescriber Every 14 (Fourteen) Days., Disp: 4 mL, Rfl: 0  •  insulin detemir (Levemir) 100 UNIT/ML injection, Inject 30 Units under the skin into the appropriate area as directed Daily., Disp: 3 mL, Rfl: 1  •  Semaglutide,0.25 or 0.5MG/DOS, (OZEMPIC) 2 MG/1.5ML solution pen-injector, 0.25mg weekly x4 weeks then 0.5 weekly x4 weeks then 1mg weekly x4 weeks, " Disp: 1.5 mL, Rfl: 1    No Known Allergies    Review of Systems   Constitutional: Negative for activity change, appetite change, chills, diaphoresis, fatigue, fever and unexpected weight change.   HENT: Negative for congestion, dental problem, drooling, ear discharge, ear pain, facial swelling, hearing loss, mouth sores, nosebleeds, postnasal drip, rhinorrhea, sinus pressure, sore throat, tinnitus and trouble swallowing.    Eyes: Negative for photophobia, pain, discharge, redness, itching and visual disturbance.   Respiratory: Negative for apnea, cough, choking, chest tightness, shortness of breath and wheezing.    Cardiovascular: Negative for chest pain, palpitations and leg swelling.        No orthopnea, PND, JONES   Gastrointestinal: Negative for abdominal pain, blood in stool, constipation, diarrhea, nausea and vomiting.   Endocrine: Negative for cold intolerance, heat intolerance, polydipsia and polyuria.   Genitourinary: Negative for decreased urine volume, dysuria, enuresis, flank pain, frequency, hematuria and urgency.   Musculoskeletal: Negative for arthralgias, back pain, gait problem, joint swelling, myalgias, neck pain and neck stiffness.   Skin: Negative for color change and rash.        No hair changes, no nail changes   Allergic/Immunologic: Negative for environmental allergies, food allergies and immunocompromised state.   Neurological: Negative for dizziness, tremors, seizures, syncope, speech difficulty, weakness, light-headedness, numbness and headaches.   Hematological: Negative for adenopathy. Does not bruise/bleed easily.   Psychiatric/Behavioral: Negative for agitation, confusion, decreased concentration, dysphoric mood, sleep disturbance and suicidal ideas. The patient is not nervous/anxious.        Objective   Vitals:    03/25/22 0959   BP: 130/80   BP Location: Right arm   Patient Position: Sitting   Cuff Size: Adult   Pulse: 102   Temp: 97.1 °F (36.2 °C)   TempSrc: Temporal   SpO2: 98%  "  Weight: (!) 152 kg (335 lb)   Height: 182.9 cm (72\")     Body mass index is 45.43 kg/m².  Physical Exam  Constitutional:       General: He is not in acute distress.     Appearance: Normal appearance. He is not diaphoretic.   HENT:      Head: Normocephalic and atraumatic.      Right Ear: Tympanic membrane, ear canal and external ear normal.      Left Ear: Tympanic membrane, ear canal and external ear normal.      Nose: Nose normal. No rhinorrhea.      Mouth/Throat:      Mouth: Mucous membranes are moist.      Pharynx: Oropharynx is clear.   Eyes:      General:         Right eye: No discharge.         Left eye: No discharge.      Conjunctiva/sclera: Conjunctivae normal.   Cardiovascular:      Rate and Rhythm: Normal rate and regular rhythm.      Pulses: Normal pulses.           Dorsalis pedis pulses are 2+ on the right side and 2+ on the left side.        Posterior tibial pulses are 2+ on the right side and 2+ on the left side.      Heart sounds: Normal heart sounds.   Pulmonary:      Effort: Pulmonary effort is normal.      Breath sounds: Normal breath sounds.   Abdominal:      General: Bowel sounds are normal.      Tenderness: There is no abdominal tenderness.   Musculoskeletal:         General: No swelling or tenderness.      Cervical back: Normal range of motion.      Right foot: Normal range of motion.      Left foot: Normal range of motion.   Feet:      Right foot:      Protective Sensation: 10 sites tested. 10 sites sensed.      Skin integrity: Skin integrity normal. No ulcer, blister or skin breakdown.      Toenail Condition: Right toenails are normal.      Left foot:      Protective Sensation: 10 sites tested. 10 sites sensed.      Skin integrity: Skin integrity normal. No ulcer, blister or skin breakdown.      Toenail Condition: Left toenails are normal.      Comments: Diabetic Foot Exam Performed and Monofilament Test Performed    Skin:     General: Skin is warm and dry.   Neurological:      General: No " focal deficit present.      Mental Status: He is alert and oriented to person, place, and time.   Psychiatric:         Mood and Affect: Mood normal.         Behavior: Behavior normal.         Judgment: Judgment normal.         Assessment/Plan   Diagnoses and all orders for this visit:    1. Physical exam (Primary)    2. Uncontrolled type 2 diabetes mellitus with hyperglycemia (HCC)  Assessment & Plan:  He is tolerating Metformin daily which was increased to 3 daily 12/2021. He is agreeable to restarting Levemir (pending insurance coverage). He is also interested in weight loss, will start Ozempic for better glucose control (will check insurance coverage).  He is given a sample of Ozempic with instructions on administration and dose titration.    Orders:  -     Hemoglobin A1c  -     Comprehensive Metabolic Panel  -     CBC & Differential  -     Semaglutide,0.25 or 0.5MG/DOS, (OZEMPIC) 2 MG/1.5ML solution pen-injector; 0.25mg weekly x4 weeks then 0.5 weekly x4 weeks then 1mg weekly x4 weeks  Dispense: 1.5 mL; Refill: 1  -     insulin detemir (Levemir) 100 UNIT/ML injection; Inject 30 Units under the skin into the appropriate area as directed Daily.  Dispense: 3 mL; Refill: 1  -     Microalbumin / Creatinine Urine Ratio - Urine, Clean Catch    3. Low testosterone  Assessment & Plan:  He is doing well with testosterone and reports improved energy level    Orders:  -     Testosterone, Free, Total    4. Primary hypertension  Assessment & Plan:  BP is stable on Losartan which he will continue along with a low sodium diet.      Risk assessment:  He has a family hx (brother x2 and father) of DM2 with poor glucose control-discussed medication adjustment (will try to begin Levemir and Ozempic). F/u with endo as scheduled.  His Body mass index is 45.43 kg/m². - He is interested in weight loss; pursuing lifestyle changes to help with weight loss. He will begin Ozempic as well.    Prevention:  He has not received his annual flu  vaccine. Tdap is not current.    Discussed healthy lifestyle choices such as maintaining a balanced diet low in carbohydrates and limiting caffeine and alcohol intake.  Recommended routine exercise for bone strength and cardiovascular health.

## 2022-03-28 LAB
ALBUMIN SERPL-MCNC: 4.5 G/DL (ref 4–5)
ALBUMIN/GLOB SERPL: 1.7 {RATIO} (ref 1.2–2.2)
ALP SERPL-CCNC: 88 IU/L (ref 44–121)
ALT SERPL-CCNC: 87 IU/L (ref 0–44)
AST SERPL-CCNC: 51 IU/L (ref 0–40)
BASOPHILS # BLD AUTO: 0 X10E3/UL (ref 0–0.2)
BASOPHILS NFR BLD AUTO: 0 %
BILIRUB SERPL-MCNC: 0.6 MG/DL (ref 0–1.2)
BUN SERPL-MCNC: 8 MG/DL (ref 6–20)
BUN/CREAT SERPL: 8 (ref 9–20)
CALCIUM SERPL-MCNC: 9.6 MG/DL (ref 8.7–10.2)
CHLORIDE SERPL-SCNC: 94 MMOL/L (ref 96–106)
CO2 SERPL-SCNC: 24 MMOL/L (ref 20–29)
CREAT SERPL-MCNC: 1 MG/DL (ref 0.76–1.27)
EGFRCR SERPLBLD CKD-EPI 2021: 101 ML/MIN/1.73
EOSINOPHIL # BLD AUTO: 0.1 X10E3/UL (ref 0–0.4)
EOSINOPHIL NFR BLD AUTO: 2 %
ERYTHROCYTE [DISTWIDTH] IN BLOOD BY AUTOMATED COUNT: 14.5 % (ref 11.6–15.4)
GLOBULIN SER CALC-MCNC: 2.6 G/DL (ref 1.5–4.5)
GLUCOSE SERPL-MCNC: 321 MG/DL (ref 65–99)
HBA1C MFR BLD: 11.2 % (ref 4.8–5.6)
HCT VFR BLD AUTO: 46.9 % (ref 37.5–51)
HGB BLD-MCNC: 15.8 G/DL (ref 13–17.7)
IMM GRANULOCYTES # BLD AUTO: 0 X10E3/UL (ref 0–0.1)
IMM GRANULOCYTES NFR BLD AUTO: 0 %
LYMPHOCYTES # BLD AUTO: 1.6 X10E3/UL (ref 0.7–3.1)
LYMPHOCYTES NFR BLD AUTO: 32 %
MCH RBC QN AUTO: 28.9 PG (ref 26.6–33)
MCHC RBC AUTO-ENTMCNC: 33.7 G/DL (ref 31.5–35.7)
MCV RBC AUTO: 86 FL (ref 79–97)
MONOCYTES # BLD AUTO: 0.5 X10E3/UL (ref 0.1–0.9)
MONOCYTES NFR BLD AUTO: 10 %
NEUTROPHILS # BLD AUTO: 2.9 X10E3/UL (ref 1.4–7)
NEUTROPHILS NFR BLD AUTO: 56 %
PLATELET # BLD AUTO: 241 X10E3/UL (ref 150–450)
POTASSIUM SERPL-SCNC: 4.9 MMOL/L (ref 3.5–5.2)
PROT SERPL-MCNC: 7.1 G/DL (ref 6–8.5)
RBC # BLD AUTO: 5.47 X10E6/UL (ref 4.14–5.8)
SODIUM SERPL-SCNC: 135 MMOL/L (ref 134–144)
TESTOST FREE SERPL-MCNC: 11.5 PG/ML (ref 8.7–25.1)
TESTOST SERPL-MCNC: 180 NG/DL (ref 264–916)
WBC # BLD AUTO: 5.2 X10E3/UL (ref 3.4–10.8)

## 2022-04-04 DIAGNOSIS — I10 PRIMARY HYPERTENSION: ICD-10-CM

## 2022-04-04 DIAGNOSIS — F41.8 PERFORMANCE ANXIETY: ICD-10-CM

## 2022-04-04 DIAGNOSIS — E11.65 UNCONTROLLED TYPE 2 DIABETES MELLITUS WITH HYPERGLYCEMIA: Primary | ICD-10-CM

## 2022-04-04 RX ORDER — PROPRANOLOL HYDROCHLORIDE 10 MG/1
TABLET ORAL
Qty: 30 TABLET | Refills: 4 | Status: SHIPPED | OUTPATIENT
Start: 2022-04-04

## 2022-04-04 RX ORDER — INSULIN GLARGINE 300 U/ML
30 INJECTION, SOLUTION SUBCUTANEOUS NIGHTLY
Qty: 4.5 ML | Refills: 1 | Status: SHIPPED | OUTPATIENT
Start: 2022-04-04 | End: 2022-07-10 | Stop reason: SDUPTHER

## 2022-04-04 RX ORDER — LOSARTAN POTASSIUM 100 MG/1
TABLET ORAL
Qty: 30 TABLET | Refills: 4 | Status: SHIPPED | OUTPATIENT
Start: 2022-04-04

## 2022-04-11 DIAGNOSIS — R10.13 DYSPEPSIA: ICD-10-CM

## 2022-04-12 RX ORDER — OMEPRAZOLE 40 MG/1
40 CAPSULE, DELAYED RELEASE ORAL DAILY
Qty: 90 CAPSULE | Refills: 2 | Status: SHIPPED | OUTPATIENT
Start: 2022-04-12

## 2022-04-30 ENCOUNTER — APPOINTMENT (OUTPATIENT)
Dept: CT IMAGING | Facility: HOSPITAL | Age: 37
End: 2022-04-30

## 2022-04-30 ENCOUNTER — HOSPITAL ENCOUNTER (EMERGENCY)
Facility: HOSPITAL | Age: 37
Discharge: HOME | End: 2022-04-30
Attending: EMERGENCY MEDICINE | Admitting: EMERGENCY MEDICINE

## 2022-04-30 VITALS
HEART RATE: 98 BPM | HEIGHT: 72 IN | SYSTOLIC BLOOD PRESSURE: 142 MMHG | BODY MASS INDEX: 42.66 KG/M2 | WEIGHT: 315 LBS | OXYGEN SATURATION: 100 % | TEMPERATURE: 97 F | RESPIRATION RATE: 16 BRPM | DIASTOLIC BLOOD PRESSURE: 92 MMHG

## 2022-04-30 DIAGNOSIS — R55 NEAR SYNCOPE: Primary | ICD-10-CM

## 2022-04-30 LAB
ALBUMIN SERPL-MCNC: 4.1 G/DL (ref 3.5–5.2)
ALBUMIN/GLOB SERPL: 1.3 G/DL
ALP SERPL-CCNC: 81 U/L (ref 39–117)
ALT SERPL W P-5'-P-CCNC: 76 U/L (ref 1–41)
ANION GAP SERPL CALCULATED.3IONS-SCNC: 8.2 MMOL/L (ref 5–15)
AST SERPL-CCNC: 38 U/L (ref 1–40)
BASOPHILS # BLD AUTO: 0.03 10*3/MM3 (ref 0–0.2)
BASOPHILS NFR BLD AUTO: 0.4 % (ref 0–1.5)
BILIRUB SERPL-MCNC: 0.8 MG/DL (ref 0–1.2)
BUN SERPL-MCNC: 8 MG/DL (ref 6–20)
BUN/CREAT SERPL: 6.8 (ref 7–25)
CALCIUM SPEC-SCNC: 9.2 MG/DL (ref 8.6–10.5)
CHLORIDE SERPL-SCNC: 97 MMOL/L (ref 98–107)
CO2 SERPL-SCNC: 25.8 MMOL/L (ref 22–29)
CREAT SERPL-MCNC: 1.18 MG/DL (ref 0.76–1.27)
D DIMER PPP FEU-MCNC: 0.57 MCGFEU/ML (ref 0–0.49)
DEPRECATED RDW RBC AUTO: 47.5 FL (ref 37–54)
EGFRCR SERPLBLD CKD-EPI 2021: 83 ML/MIN/1.73
EOSINOPHIL # BLD AUTO: 0.08 10*3/MM3 (ref 0–0.4)
EOSINOPHIL NFR BLD AUTO: 1 % (ref 0.3–6.2)
ERYTHROCYTE [DISTWIDTH] IN BLOOD BY AUTOMATED COUNT: 14.6 % (ref 12.3–15.4)
GLOBULIN UR ELPH-MCNC: 3.2 GM/DL
GLUCOSE BLDC GLUCOMTR-MCNC: 211 MG/DL (ref 70–130)
GLUCOSE SERPL-MCNC: 279 MG/DL (ref 65–99)
HCT VFR BLD AUTO: 45.8 % (ref 37.5–51)
HGB BLD-MCNC: 15.7 G/DL (ref 13–17.7)
IMM GRANULOCYTES # BLD AUTO: 0.02 10*3/MM3 (ref 0–0.05)
IMM GRANULOCYTES NFR BLD AUTO: 0.2 % (ref 0–0.5)
LYMPHOCYTES # BLD AUTO: 2.24 10*3/MM3 (ref 0.7–3.1)
LYMPHOCYTES NFR BLD AUTO: 27.3 % (ref 19.6–45.3)
MAGNESIUM SERPL-MCNC: 1.6 MG/DL (ref 1.6–2.6)
MCH RBC QN AUTO: 30.4 PG (ref 26.6–33)
MCHC RBC AUTO-ENTMCNC: 34.3 G/DL (ref 31.5–35.7)
MCV RBC AUTO: 88.8 FL (ref 79–97)
MONOCYTES # BLD AUTO: 0.76 10*3/MM3 (ref 0.1–0.9)
MONOCYTES NFR BLD AUTO: 9.3 % (ref 5–12)
NEUTROPHILS NFR BLD AUTO: 5.07 10*3/MM3 (ref 1.7–7)
NEUTROPHILS NFR BLD AUTO: 61.8 % (ref 42.7–76)
NRBC BLD AUTO-RTO: 0 /100 WBC (ref 0–0.2)
PLATELET # BLD AUTO: 293 10*3/MM3 (ref 140–450)
PMV BLD AUTO: 10.9 FL (ref 6–12)
POTASSIUM SERPL-SCNC: 4.4 MMOL/L (ref 3.5–5.2)
PROT SERPL-MCNC: 7.3 G/DL (ref 6–8.5)
QT INTERVAL: 288 MS
RBC # BLD AUTO: 5.16 10*6/MM3 (ref 4.14–5.8)
SODIUM SERPL-SCNC: 131 MMOL/L (ref 136–145)
TROPONIN T SERPL-MCNC: <0.01 NG/ML (ref 0–0.03)
WBC NRBC COR # BLD: 8.2 10*3/MM3 (ref 3.4–10.8)

## 2022-04-30 PROCEDURE — 84484 ASSAY OF TROPONIN QUANT: CPT | Performed by: EMERGENCY MEDICINE

## 2022-04-30 PROCEDURE — 80053 COMPREHEN METABOLIC PANEL: CPT | Performed by: EMERGENCY MEDICINE

## 2022-04-30 PROCEDURE — 71275 CT ANGIOGRAPHY CHEST: CPT

## 2022-04-30 PROCEDURE — 82962 GLUCOSE BLOOD TEST: CPT

## 2022-04-30 PROCEDURE — 93010 ELECTROCARDIOGRAM REPORT: CPT | Performed by: INTERNAL MEDICINE

## 2022-04-30 PROCEDURE — 85379 FIBRIN DEGRADATION QUANT: CPT | Performed by: EMERGENCY MEDICINE

## 2022-04-30 PROCEDURE — 83735 ASSAY OF MAGNESIUM: CPT | Performed by: EMERGENCY MEDICINE

## 2022-04-30 PROCEDURE — 0 IOPAMIDOL PER 1 ML: Performed by: EMERGENCY MEDICINE

## 2022-04-30 PROCEDURE — 99283 EMERGENCY DEPT VISIT LOW MDM: CPT

## 2022-04-30 PROCEDURE — 93005 ELECTROCARDIOGRAM TRACING: CPT | Performed by: EMERGENCY MEDICINE

## 2022-04-30 PROCEDURE — 70450 CT HEAD/BRAIN W/O DYE: CPT

## 2022-04-30 PROCEDURE — 85025 COMPLETE CBC W/AUTO DIFF WBC: CPT | Performed by: EMERGENCY MEDICINE

## 2022-04-30 RX ADMIN — IOPAMIDOL 95 ML: 755 INJECTION, SOLUTION INTRAVENOUS at 17:32

## 2022-04-30 RX ADMIN — SODIUM CHLORIDE 1000 ML: 9 INJECTION, SOLUTION INTRAVENOUS at 15:53

## 2022-05-03 ENCOUNTER — OFFICE VISIT (OUTPATIENT)
Dept: INTERNAL MEDICINE | Facility: CLINIC | Age: 37
End: 2022-05-03

## 2022-05-03 VITALS
OXYGEN SATURATION: 98 % | BODY MASS INDEX: 42.66 KG/M2 | SYSTOLIC BLOOD PRESSURE: 160 MMHG | WEIGHT: 315 LBS | DIASTOLIC BLOOD PRESSURE: 100 MMHG | HEIGHT: 72 IN | HEART RATE: 104 BPM | TEMPERATURE: 97.8 F

## 2022-05-03 DIAGNOSIS — E11.65 UNCONTROLLED TYPE 2 DIABETES MELLITUS WITH HYPERGLYCEMIA: Chronic | ICD-10-CM

## 2022-05-03 DIAGNOSIS — R55 NEAR SYNCOPE: Primary | ICD-10-CM

## 2022-05-03 PROCEDURE — 99214 OFFICE O/P EST MOD 30 MIN: CPT | Performed by: NURSE PRACTITIONER

## 2022-05-03 NOTE — PATIENT INSTRUCTIONS
Increase by 2 units every 4 days until fasting glucose is <140. Monitor for low glucose readings as you increase Toujeo.

## 2022-05-03 NOTE — PROGRESS NOTES
"Chief Complaint  Follow-up (From [facility] ER On 4/30/22)    Missy Lockwood presents to [facility] for f/u regarding recent ER for near syncopal episode.    He was at home April 30 when he began to feel lightheaded and a sense that the room was getting dark and closing in on him.  He experienced numbness in his left arm and felt like it did not work.  His symptoms lasted approximately 15 minutes and then resolved.  He reported to the emergency room where a CT scan of the head, chest x-ray and labs did not show any acute abnormalities.He was offered admission but declined.  He reports feeling well since then without any chest pain or palpitations.  He has a history of poorly controlled DM 2 and we have been adjusting his medications for better control.  He is doing well with Ozempic and has titrated dose to 0.5 mg weekly.      Objective   Vital Signs:   /100 (BP Location: Left arm, Patient Position: Sitting, Cuff Size: Adult)   Pulse 104   Temp 97.8 °F (36.6 °C) (Temporal)   Ht 182.9 cm (72\")   Wt (!) 154 kg (340 lb)   SpO2 98%   BMI 46.11 kg/m²     Physical Exam  Constitutional:       Appearance: He is well-developed. He is not ill-appearing.   HENT:      Head: Normocephalic.      Right Ear: Hearing, tympanic membrane and external ear normal.      Left Ear: Hearing, tympanic membrane and external ear normal.      Nose: Nose normal. No nasal deformity, mucosal edema or rhinorrhea.      Right Sinus: No maxillary sinus tenderness or frontal sinus tenderness.      Left Sinus: No maxillary sinus tenderness or frontal sinus tenderness.      Mouth/Throat:      Dentition: Normal dentition.   Eyes:      General: Lids are normal.         Right eye: No discharge.         Left eye: No discharge.      Conjunctiva/sclera: Conjunctivae normal.      Right eye: No exudate.     Left eye: No exudate.  Neck:      Thyroid: No thyroid mass or thyromegaly.      Vascular: No carotid " bruit.      Trachea: Trachea normal.   Cardiovascular:      Rate and Rhythm: Regular rhythm.      Pulses: Normal pulses.      Heart sounds: Normal heart sounds. No murmur heard.  Pulmonary:      Effort: No respiratory distress.      Breath sounds: Normal breath sounds. No decreased breath sounds, wheezing, rhonchi or rales.   Abdominal:      General: Bowel sounds are normal.      Palpations: Abdomen is soft.      Tenderness: There is no abdominal tenderness.   Musculoskeletal:      Cervical back: Normal range of motion. No edema.   Lymphadenopathy:      Head:      Right side of head: No submental, submandibular, tonsillar, preauricular, posterior auricular or occipital adenopathy.      Left side of head: No submental, submandibular, tonsillar, preauricular, posterior auricular or occipital adenopathy.   Skin:     General: Skin is warm and dry.      Nails: There is no clubbing.   Neurological:      Mental Status: He is alert.      Sensory: Sensation is intact.      Motor: Motor function is intact.   Psychiatric:         Behavior: Behavior is cooperative.        Result Review :   The following data was reviewed by: ASHWIN Ward on 05/03/2022:  Common labs    Common Labsle 2/23/22 2/23/22 3/25/22 3/25/22 3/25/22 4/30/22 4/30/22    2203 2203 1110 1110 1110 1553 1658   Glucose  190 (A)  321 (A)  279 (A)    BUN  15  8  8    Creatinine  1.35 (A)  1.00  1.18    eGFR African Am  73        Sodium  132 (A)  135  131 (A)    Potassium  4.5  4.9  4.4    Chloride  97 (A)  94 (A)  97 (A)    Calcium  8.4 (A)  9.6  9.2    Total Protein    7.1      Albumin  3.90  4.5  4.10    Total Bilirubin  1.0  0.6  0.8    Alkaline Phosphatase  57  88  81    AST (SGOT)  70 (A)  51 (A)  38    ALT (SGPT)  99 (A)  87 (A)  76 (A)    WBC 5.69    5.2  8.20   Hemoglobin 15.9    15.8  15.7   Hematocrit 47.9    46.9  45.8   Platelets 233    241  293   Hemoglobin A1C   11.2 (A)       (A) Abnormal value       Comments are available for some  flowsheets but are not being displayed.           Data reviewed: Radiologic studies CT head, CXR 4/30/22 and Recent hospitalization notes 4/30/22          Assessment and Plan    Diagnoses and all orders for this visit:    1. Near syncope (Primary)  Assessment & Plan:  Reviewed recent ER notes with patient which did not show any acute abnormalities.  Symptoms due to vasovagal response versus dehydration.  His glucose has been fluctuating but has been steadily improving with medication changes.  We will continue to monitor and consider further evaluation if symptoms persist.  He did have his apple watch on at the time and checked an EKG which did show normal sinus rhythm when incident occurred.      2. Uncontrolled type 2 diabetes mellitus with hyperglycemia (HCC)  Assessment & Plan:  He is doing well with Ozempic and we will continue to titrate up to 1 mg weekly.  We also discussed his Toujeo dose.  His glucose has been consistently above 200 on 30 units daily.  We discussed titrating by 2 units every 4 days to achieve better glucose control he will call with glucose readings in approximately 3 to 4 weeks.    Orders:  -     Semaglutide, 1 MG/DOSE, (OZEMPIC) 2 MG/1.5ML solution pen-injector; Inject 1 mg under the skin into the appropriate area as directed 1 (One) Time Per Week.  Dispense: 1.5 mL; Refill: 1             Follow Up   Return if symptoms worsen or fail to improve, for Next scheduled follow up.  Patient was given instructions and counseling regarding his condition or for health maintenance advice. Please see specific information pulled into the AVS if appropriate.

## 2022-05-04 PROBLEM — R55 NEAR SYNCOPE: Status: ACTIVE | Noted: 2022-05-04

## 2022-05-04 NOTE — ASSESSMENT & PLAN NOTE
Reviewed recent ER notes with patient which did not show any acute abnormalities.  Symptoms due to vasovagal response versus dehydration.  His glucose has been fluctuating but has been steadily improving with medication changes.  We will continue to monitor and consider further evaluation if symptoms persist.  He did have his apple watch on at the time and checked an EKG which did show normal sinus rhythm when incident occurred.

## 2022-05-04 NOTE — ASSESSMENT & PLAN NOTE
He is doing well with Ozempic and we will continue to titrate up to 1 mg weekly.  We also discussed his Toujeo dose.  His glucose has been consistently above 200 on 30 units daily.  We discussed titrating by 2 units every 4 days to achieve better glucose control he will call with glucose readings in approximately 3 to 4 weeks.

## 2022-07-10 DIAGNOSIS — E11.65 UNCONTROLLED TYPE 2 DIABETES MELLITUS WITH HYPERGLYCEMIA: ICD-10-CM

## 2022-07-10 DIAGNOSIS — R79.89 LOW TESTOSTERONE: ICD-10-CM

## 2022-07-14 NOTE — TELEPHONE ENCOUNTER
Rx Refill Note  Requested Prescriptions     Pending Prescriptions Disp Refills   • testosterone cypionate (Depo-Testosterone) 100 MG/ML solution injection 4 mL 0     Sig: Inject 2 mL into the appropriate muscle as directed by prescriber Every 14 (Fourteen) Days.   • Insulin Glargine, 1 Unit Dial, (Toujeo SoloStar) 300 UNIT/ML solution pen-injector injection 4.5 mL 1     Sig: Inject 30 Units under the skin into the appropriate area as directed Every Night.      Last office visit with prescribing clinician: 5/3/2022      Next office visit with prescribing clinician: 7/29/2022            Hedy Ricardo MA  07/14/22, 14:09 EDT

## 2022-07-19 DIAGNOSIS — R79.89 LOW TESTOSTERONE: ICD-10-CM

## 2022-07-19 DIAGNOSIS — E11.65 UNCONTROLLED TYPE 2 DIABETES MELLITUS WITH HYPERGLYCEMIA: ICD-10-CM

## 2022-07-20 RX ORDER — TESTOSTERONE CYPIONATE 100 MG/ML
200 INJECTION, SOLUTION INTRAMUSCULAR
Qty: 4 ML | Refills: 0 | Status: SHIPPED | OUTPATIENT
Start: 2022-07-20

## 2022-07-20 RX ORDER — INSULIN GLARGINE 300 U/ML
30 INJECTION, SOLUTION SUBCUTANEOUS NIGHTLY
Qty: 4.5 ML | Refills: 1 | Status: SHIPPED | OUTPATIENT
Start: 2022-07-20 | End: 2022-09-15 | Stop reason: SDUPTHER

## 2022-07-21 DIAGNOSIS — R79.89 LOW TESTOSTERONE: ICD-10-CM

## 2022-07-21 RX ORDER — TESTOSTERONE CYPIONATE 200 MG/ML
200 INJECTION, SOLUTION INTRAMUSCULAR
Qty: 6 ML | Refills: 1 | Status: CANCELLED | OUTPATIENT
Start: 2022-07-21

## 2022-07-21 RX ORDER — TESTOSTERONE CYPIONATE 100 MG/ML
200 INJECTION, SOLUTION INTRAMUSCULAR
Qty: 4 ML | Refills: 0 | Status: CANCELLED | OUTPATIENT
Start: 2022-07-21

## 2022-07-21 NOTE — TELEPHONE ENCOUNTER
PHARMACY IS CALLING TO SEE IF TESTOSTERONE CAN BE CHANGED TO THE 200MG BOTTLE AS THEY DO NOT HAVE THE 100MGS.

## 2022-07-22 RX ORDER — INSULIN GLARGINE 300 U/ML
30 INJECTION, SOLUTION SUBCUTANEOUS NIGHTLY
Qty: 6 ML | Refills: 0 | OUTPATIENT
Start: 2022-07-22

## 2022-07-22 RX ORDER — TESTOSTERONE CYPIONATE 100 MG/ML
INJECTION, SOLUTION INTRAMUSCULAR
Qty: 4 ML | Refills: 0 | Status: SHIPPED | OUTPATIENT
Start: 2022-07-22 | End: 2022-07-22 | Stop reason: CLARIF

## 2022-07-25 RX ORDER — TESTOSTERONE CYPIONATE 200 MG/ML
200 INJECTION, SOLUTION INTRAMUSCULAR
Qty: 2 ML | Refills: 0 | Status: SHIPPED | OUTPATIENT
Start: 2022-07-25 | End: 2022-09-15 | Stop reason: SDUPTHER

## 2022-09-08 DIAGNOSIS — E11.65 UNCONTROLLED TYPE 2 DIABETES MELLITUS WITH HYPERGLYCEMIA: Chronic | ICD-10-CM

## 2022-09-08 RX ORDER — SEMAGLUTIDE 1.34 MG/ML
INJECTION, SOLUTION SUBCUTANEOUS
Qty: 6 ML | Refills: 0 | Status: SHIPPED | OUTPATIENT
Start: 2022-09-08 | End: 2022-09-15 | Stop reason: SDUPTHER

## 2022-09-15 DIAGNOSIS — E11.65 UNCONTROLLED TYPE 2 DIABETES MELLITUS WITH HYPERGLYCEMIA: Chronic | ICD-10-CM

## 2022-09-15 DIAGNOSIS — R79.89 LOW TESTOSTERONE: ICD-10-CM

## 2022-09-16 NOTE — TELEPHONE ENCOUNTER
Please clarify patient's status-he cancelled appt with endocrinology because he was moving, he is still in [place]? He needs to be evluated with labs if so. Thanks.

## 2022-09-20 RX ORDER — TESTOSTERONE CYPIONATE 200 MG/ML
200 INJECTION, SOLUTION INTRAMUSCULAR
Qty: 2 ML | Refills: 0 | Status: SHIPPED | OUTPATIENT
Start: 2022-09-20

## 2022-09-20 RX ORDER — INSULIN GLARGINE 300 U/ML
30 INJECTION, SOLUTION SUBCUTANEOUS NIGHTLY
Qty: 4.5 ML | Refills: 0 | Status: SHIPPED | OUTPATIENT
Start: 2022-09-20

## 2022-09-20 RX ORDER — SEMAGLUTIDE 1.34 MG/ML
1 INJECTION, SOLUTION SUBCUTANEOUS WEEKLY
Qty: 6 ML | Refills: 0 | Status: SHIPPED | OUTPATIENT
Start: 2022-09-20

## 2022-09-20 NOTE — TELEPHONE ENCOUNTER
Patient states he has moved to [place]. Asked patient if he wanted to continue seeing you and he said he thinks it is too far. He is waiting on his new insurance card to come in the mail to find a new, local PCP. Can we still refill these? Last seen in May 2022.

## 2022-10-18 DIAGNOSIS — E11.65 UNCONTROLLED TYPE 2 DIABETES MELLITUS WITH HYPERGLYCEMIA: Chronic | ICD-10-CM

## 2022-10-18 RX ORDER — INSULIN GLARGINE 300 U/ML
INJECTION, SOLUTION SUBCUTANEOUS
Qty: 12 ML | Refills: 0 | OUTPATIENT
Start: 2022-10-18

## 2023-04-25 ENCOUNTER — PRIOR AUTHORIZATION (OUTPATIENT)
Dept: INTERNAL MEDICINE | Facility: CLINIC | Age: 38
End: 2023-04-25
Payer: COMMERCIAL

## 2023-05-04 ENCOUNTER — TELEPHONE (OUTPATIENT)
Dept: INTERNAL MEDICINE | Facility: CLINIC | Age: 38
End: 2023-05-04

## 2023-05-04 NOTE — TELEPHONE ENCOUNTER
Caller: RAND WITH [employer]    Relationship: [employer]    Best call back number: [phone] REF GOSS [id number]    What test/procedure requested: Semaglutide, 1 MG/DOSE, (Ozempic, 1 MG/DOSE,) 4 MG/3ML solution pen-injector    When is it needed: ASAP    Where is the test/procedure going to be performed: [facility] - [place], [place] - [address] [phone] [facility] [phone] FX     Additional information or concerns: WAS A RENEWAL ON THE PRIOR AUTH BECAUSE THE OLD PRIOR AUTH WAS EXPIRING AND THE ERROR RAND WAS CALLING TO CHECK ON THIS, [employer] HAS A DIFFERENT PATIENT ADDRESS ON FILE AND THIS COULD BE THE REASON FOR THE ERROR, PLEASE CALL HER BACK REGARDING THIS ASAP

## 2025-05-08 NOTE — ASSESSMENT & PLAN NOTE
He is feeling better since starting testosterone injections which he will continue.   Medication: Metformin ER    Plan does not cover Glumetza, please send Glucophage  mg tabs 2 BID to the pharmacy or advise why this change is not medically appropriate.